# Patient Record
Sex: FEMALE | Race: OTHER | HISPANIC OR LATINO
[De-identification: names, ages, dates, MRNs, and addresses within clinical notes are randomized per-mention and may not be internally consistent; named-entity substitution may affect disease eponyms.]

---

## 2016-12-28 NOTE — ED ADULT NURSE REASSESSMENT NOTE - NIH STROKE SCALE: 8. SENSORY, QM
(1) Mild-to-moderate sensory loss; patient feels pinprick is less sharp or is dull on the affected side; or there is a loss of superficial pain with pinprick, but patient is aware of being touched

## 2016-12-28 NOTE — CONSULT NOTE ADULT - SUBJECTIVE AND OBJECTIVE BOX
CC: Patient is a 71y old  Female who presents with a chief complaint of lt heel pain, weakness (28 Dec 2016 15:06)  Source: Daughter Paulina Diego by phone    HPI:  71yr old female with PMH DM, HTN, HPL, CVA, COPD, CKD-3, carotid artery stenosis, CAD was brought by family after she c/o pain in her Lt foot, abdominal pain, feeling sick to her stomach, Fever  4days ago and "not herself" She has h/o heel osteomyelitis , was treated with iv antibiotics for 3mths, she was in rehab and discharged in end of november with home care. She has had home care with daily dressings. In the ER< vitals are stable, Labs s/o NEERU on CKD, leucocytosis and clinically purulent draining Lt heel ulcer. After discussion with daughter, family states she started having change in mental status at home with grandson, drooling, speech change, weakness. No report of trauma. Currently patient not responsive.         PAST MEDICAL:  Insomnia  Neuropathy  Hyperlipemia  DVT (deep venous thrombosis): Rt Upper Extremity  Atrial fibrillation, unspecified  Chronic diastolic congestive heart failure  MI (myocardial infarction)  PVD (posterior vitreous detachment), bilateral: has stents place in groin  COPD (chronic obstructive pulmonary disease)  Carotid artery stenosis: had surgery  CRI (chronic renal insufficiency)  Hypertension  Diabetes mellitus type II  CVA (cerebral vascular accident)  HEMORRHAGIC STROKE 4-5 YRS AGO       PAST SURGICAL HISTORY:    History of spinal surgery  Amputated BIG toe LEFT,  TRANSMETATARSAL AMP  rt foot  Carotid stenosis, left: s/p carotid endarterectomy  History of cholecystectomy  History of appendectomy      SOCIAL HISTORY: Unobtainable at this time    FAMILY HISTORY:  Family history of diabetes mellitus (Sibling, Grandparent)  No pertinent family history in first degree relatives      ROS: UNOBTAINABLE AT THIS TIME        MEDICATIONS  (STANDING):  insulin lispro (HumaLOG) corrective regimen sliding scale  SubCutaneous three times a day before meals  simvastatin 20milliGRAM(s) Oral at bedtime  dextrose 50% Injectable 25Gram(s) IV Push once  diltiazem   CD 240milliGRAM(s) Oral daily  ertapenem  IVPB 1000milliGRAM(s) IV Intermittent every 24 hours  dextrose 5%. 1000milliLiter(s) IV Continuous <Continuous>  dextrose 50% Injectable 12.5Gram(s) IV Push once  dextrose 50% Injectable 25Gram(s) IV Push once  gabapentin 100milliGRAM(s) Oral two times a day  citalopram 10milliGRAM(s) Oral daily  pantoprazole    Tablet 40milliGRAM(s) Oral before breakfast  labetalol 200milliGRAM(s) Oral every 8 hours  hydrALAZINE 50milliGRAM(s) Oral every 8 hours  mupirocin 2% Ointment 1Application(s) Topical daily    MEDICATIONS  (PRN):  dextrose Gel 1Dose(s) Oral once PRN Blood Glucose LESS THAN 70 milliGRAM(s)/deciliter  glucagon  Injectable 1milliGRAM(s) IntraMuscular once PRN Glucose LESS THAN 70 milligrams/deciliter  ondansetron Injectable 4milliGRAM(s) IV Push every 6 hours PRN Nausea      Allergies    codeine (Other)  morphine (Unknown)  penicillin (Urticaria; Rash)            Vital Signs Last 24 Hrs  T(C): 36.7, Max: 37.7 (12-28 @ 10:13)  T(F): 98, Max: 99.8 (12-28 @ 10:13)  HR: 108 (90 - 110)  BP: 202/102 (190/94 - 235/116)  BP(mean): --  RR: 16 (16 - 18)  SpO2: 99% (98% - 100%)    PHYSICAL EXAM:  GENERAL: UNRESPONSIVE  HEAD:  Atraumatic, Normocephalic  EYES: NOT  OPENING TO VERBAL COMMAND, OR PAINFUL STIMULI TO EXTREMITIES, HOWEVER DOES OPEN EYES WITH ABDOMINAL PALPATION   ENMT: UNABLE TO EXAMINE  NECK: SLIGHT HYPEREXTENSION, TORSED TO THE RIGHT, LEFT WELL HEALED INCISION OVER COROTID AREA WITH COROTID VESSEL DISTENSION  NERVOUS SYSTEM:  Alert & Oriented X3, Good concentration; Motor Strength 5/5 B/L upper and lower extremities; DTRs 2+ intact and symmetric  CHEST/LUNG: Clear to percussion bilaterally; No rales, rhonchi, wheezing, or rubs  HEART: Regular rate, TACHYCARDIC  ABDOMEN: Soft,OBESE, NON DISTENDED, EYE OPENING WITH PALPATION  EXTREMITIES:  2+ Peripheral Pulses, FEM/RAD, NON PALPABLE DP/PT BILAT, NOTED LEFT BIG TOE AMP/RIGHT TRANS METATARSAL AMPUTATION, INTACT DRESSING  LYMPH: No lymphadenopathy noted  SKIN: No rashes or lesions      RADIOLOGY & ADDITIONAL STUDIES:                          13.2   13.38 )-----------( 353      ( 28 Dec 2016 12:46 )             42.0     28 Dec 2016 12:46    133    |  95     |  47.0   ----------------------------<  101    6.3     |  24.0   |  2.96     Ca    9.7        28 Dec 2016 12:46    TPro  8.7    /  Alb  4.2    /  TBili  0.3    /  DBili  x      /  AST  61     /  ALT  24     /  AlkPhos  101    28 Dec 2016 12:46    PT/INR - ( 28 Dec 2016 12:46 )   PT: 11.4 sec;   INR: 1.04 ratio         PTT - ( 28 Dec 2016 12:46 )  PTT:30.0 sec      RADIOLOGY & ADDITIONAL STUDIES:      IMP:  PLAN: DISCUSSED WITH CC: Patient is a 71y old  Female who presents with a chief complaint of lt heel pain, weakness (28 Dec 2016 15:06)  Source: Daughter Paulina Diego by phone AND  ER CHART    HPI:  71yr old female with PMH DM, HTN, HPL, CVA, COPD, CKD-3, carotid artery stenosis, CAD was brought by family after she c/o pain in her Lt foot, abdominal pain, feeling sick to her stomach, Fever  4days ago and "not herself" She has h/o heel osteomyelitis , was treated with iv antibiotics for 3mths, she was in rehab and discharged in end of november with home care. She has had home care with daily dressings. In the ER< vitals are stable, Labs s/o NEERU on CKD, leucocytosis and clinically purulent draining Lt heel ulcer. After discussion with daughter, family states she started having change in mental status at home with grandson, drooling, speech change, weakness. No report of trauma. Currently patient not responsive.         PAST MEDICAL:  Insomnia  Neuropathy  Hyperlipemia  DVT (deep venous thrombosis): Rt Upper Extremity  Atrial fibrillation, unspecified  Chronic diastolic congestive heart failure  MI (myocardial infarction)  PVD (posterior vitreous detachment), bilateral: has stents place in groin  COPD (chronic obstructive pulmonary disease)  Carotid artery stenosis: had surgery  CRI (chronic renal insufficiency)  Hypertension  Diabetes mellitus type II  CVA (cerebral vascular accident)  HEMORRHAGIC STROKE 4-5 YRS AGO       PAST SURGICAL HISTORY:    History of spinal surgery  Amputated BIG toe LEFT,  TRANSMETATARSAL AMP  rt foot  Carotid stenosis, left: s/p carotid endarterectomy  History of cholecystectomy  History of appendectomy      SOCIAL HISTORY: Unobtainable at this time    FAMILY HISTORY:  Family history of diabetes mellitus (Sibling, Grandparent)        ROS: UNOBTAINABLE AT THIS TIME        MEDICATIONS  (STANDING):  insulin lispro (HumaLOG) corrective regimen sliding scale  SubCutaneous three times a day before meals  simvastatin 20milliGRAM(s) Oral at bedtime  dextrose 50% Injectable 25Gram(s) IV Push once  diltiazem   CD 240milliGRAM(s) Oral daily  ertapenem  IVPB 1000milliGRAM(s) IV Intermittent every 24 hours  dextrose 5%. 1000milliLiter(s) IV Continuous <Continuous>  dextrose 50% Injectable 12.5Gram(s) IV Push once  dextrose 50% Injectable 25Gram(s) IV Push once  gabapentin 100milliGRAM(s) Oral two times a day  citalopram 10milliGRAM(s) Oral daily  pantoprazole    Tablet 40milliGRAM(s) Oral before breakfast  labetalol 200milliGRAM(s) Oral every 8 hours  hydrALAZINE 50milliGRAM(s) Oral every 8 hours  mupirocin 2% Ointment 1Application(s) Topical daily    MEDICATIONS  (PRN):  dextrose Gel 1Dose(s) Oral once PRN Blood Glucose LESS THAN 70 milliGRAM(s)/deciliter  glucagon  Injectable 1milliGRAM(s) IntraMuscular once PRN Glucose LESS THAN 70 milligrams/deciliter  ondansetron Injectable 4milliGRAM(s) IV Push every 6 hours PRN Nausea      Allergies    codeine (Other)  morphine (Unknown)  penicillin (Urticaria; Rash)            Vital Signs Last 24 Hrs  T(C): 36.7, Max: 37.7 (12-28 @ 10:13)  T(F): 98, Max: 99.8 (12-28 @ 10:13)  HR: 108 (90 - 110)  BP: 202/102 (190/94 - 235/116)  BP(mean): --  RR: 16 (16 - 18)  SpO2: 99% (98% - 100%)    PHYSICAL EXAM:  GENERAL: UNRESPONSIVE  HEAD:  Atraumatic, Normocephalic  EYES: NOT  OPENING TO VERBAL COMMAND, OR PAINFUL STIMULI TO EXTREMITIES, HOWEVER DOES OPEN EYES WITH ABDOMINAL PALPATION   ENMT: UNABLE TO EXAMINE  NECK: SLIGHT HYPEREXTENSION, TORSED TO THE RIGHT, LEFT WELL HEALED INCISION OVER COROTID AREA WITH COROTID VESSEL DISTENSION  NERVOUS SYSTEM: EYES CLOSED, OPEN ONLY TO ABDOMINAL PALPATION, PUPILS EQUAL, SLUGGISH TO LIGHT BILAT, RIGHTWARD GAZE, NOT FOLLOWING      ANY COMMANDS, NO REACTION TO PERIPHERAL PAINFUL STIMULUS   CHEST/LUNG: Clear to percussion bilaterally; No rales, rhonchi, wheezing, or rubs  HEART: Regular rate, TACHYCARDIC  ABDOMEN: Soft,OBESE, NON DISTENDED, EYE OPENING WITH PALPATION  EXTREMITIES:  2+ Peripheral Pulses, FEM/RAD, NON PALPABLE DP/PT BILAT, NOTED LEFT BIG TOE AMP/RIGHT TRANS METATARSAL AMPUTATION, INTACT DRESSING  LYMPH: No lymphadenopathy noted  SKIN: No rashes or lesions      RADIOLOGY & ADDITIONAL STUDIES:  There is a large right frontal intraparenchymal hematoma, measuring  approximately 4.5 x 7.0 x 5.1 cm (TV by AP by CC). There is breakthrough  into the ventricle with hemorrhage opacifying portions of the right frontal  ventricle, and a small amount of hemorrhage layering in the occipital horn  of the left lateral ventricle. There is marked local mass effect with  approximately 1.5 cm of leftward midline shift. Hemorrhage is opacifying the  third ventricle and ventricular aqueduct as well. There is some hemorrhage  crossing the corpus callosum.    Bones and soft tissues: No acute calvarial fracture.  Sinuses and mastoids: Unremarkable.    IMPRESSION:    Large right frontal intraparenchymal hematoma with ventricular breakthrough  and resultant intraventricular hemorrhage. 1.5 cm of leftward midline shift.  No magdalena herniation at this time. Some hemorrhage appears to be crossing the  corpus callosum. Additionally, there is a hematocrit level with some  relatively less dense hemorrhage in what is likely the body of the right  lateral ventricle. Further evaluation suggested with CTA/MRI as indicated to  exclude the possibility of underlying vascular anomaly or mass lesion.                              13.2   13.38 )-----------( 353      ( 28 Dec 2016 12:46 )             42.0     28 Dec 2016 12:46    133    |  95     |  47.0   ----------------------------<  101    6.3     |  24.0   |  2.96     Ca    9.7        28 Dec 2016 12:46    TPro  8.7    /  Alb  4.2    /  TBili  0.3    /  DBili  x      /  AST  61     /  ALT  24     /  AlkPhos  101    28 Dec 2016 12:46    PT/INR - ( 28 Dec 2016 12:46 )   PT: 11.4 sec;   INR: 1.04 ratio         PTT - ( 28 Dec 2016 12:46 )  PTT:30.0 sec

## 2016-12-28 NOTE — ED ADULT TRIAGE NOTE - CHIEF COMPLAINT QUOTE
pt presents to ED with left sided weakness.  as per grandson, pt is at baseline right now, pt had prior stroke with no deficits as per EMS> pt follows some simple commands. as per ems, grandson states pt was leaning more to left side this morning with drooling noted. no drooling upon arrival to ED,  pt answers yes and no questions at baseline. breathing si even and unlabored. pt slightly weaker to left upper and left lower extremity. denies n/v. last seen normal was last night.

## 2016-12-28 NOTE — H&P ADULT. - PSH
Amputated great toe of left foot    Amputated toe, unspecified laterality  partial amputation of rt foot  Carotid stenosis, left  s/p carotid endarterectomy  History of appendectomy    History of cholecystectomy    History of spinal surgery

## 2016-12-28 NOTE — INPATIENT CERTIFICATION FOR MEDICARE PATIENTS - IN ORDER TO MEET MEDICARE REQUIREMENTS.
In order to meet Medicare requirements, the clinical documentation must support the information cited in the admission order.  Please be sure to provide detailed and clear documentation about the following in the admitting note/history and physical:

## 2016-12-28 NOTE — ED ADULT NURSE NOTE - OBJECTIVE STATEMENT
Received pt in 9 answers some questions appropriately sent by grand son stated that she was not asking normal  pt alert to name and birthdate respirations even weakness on left side extremities due to priror stoke.  Left foot heel pressure ulcer noted approximately 5cm by 5 cm with purulent drainage. Received pt in 9 answers some questions appropriately sent by grand son stated that she was not asking normal  pt alert to name and birthdate respirations even weakness on left side extremities due to priror stoke.  Left foot heel pressure ulcer noted approximately 5cm by 5 cm with purulent drainage.  Left Great toe amputation and 5 toes on right foot amputated

## 2016-12-28 NOTE — ED PROVIDER NOTE - CARE PLAN
Principal Discharge DX:	Heel ulcer  Secondary Diagnosis:	Cellulitis  Secondary Diagnosis:	Renal failure Principal Discharge DX:	Hyperkalemia  Secondary Diagnosis:	Cellulitis  Secondary Diagnosis:	Renal failure

## 2016-12-28 NOTE — H&P ADULT. - PROBLEM SELECTOR PLAN 1
e/o leucocytosis, purulent discharge, AKIon CKD  start zosyn + vanc - renally dosed  f/u cultures  iv fluids to hydrate  ID conulted - Dr Wilkerson  Podiatry - Dr Arellano

## 2016-12-28 NOTE — CONSULT NOTE ADULT - SUBJECTIVE AND OBJECTIVE BOX
S : 71y year old Female seen at bedside for bilateral heel ulceration.  Patient is unable to communicate.      PMH: Insomnia  Neuropathy  Hyperlipemia  DVT (deep venous thrombosis)  Atrial fibrillation, unspecified  Chronic diastolic congestive heart failure  MI (myocardial infarction)  PVD (posterior vitreous detachment), bilateral  COPD (chronic obstructive pulmonary disease)  Carotid artery stenosis  CRI (chronic renal insufficiency)  Hypertension  Diabetes mellitus type II  CVA (cerebral vascular accident)    PSH:History of spinal surgery  Amputated toe, unspecified laterality  Amputated great toe of left foot  Carotid stenosis, left  History of cholecystectomy  History of appendectomy      Allergies:codeine (Other)  morphine (Unknown)  penicillin (Urticaria; Rash)      Labs:                          13.2   13.38 )-----------( 353      ( 28 Dec 2016 12:46 )             42.0     WBC Trend  13.38<H> Date (12-28 @ 12:46)      Chem  28 Dec 2016 12:46    133    |  95     |  47.0   ----------------------------<  101    6.3     |  24.0   |  2.96     Ca    9.7        28 Dec 2016 12:46    TPro  8.7    /  Alb  4.2    /  TBili  0.3    /  DBili  x      /  AST  61     /  ALT  24     /  AlkPhos  101    28 Dec 2016 12:46          T(F): 97.8, Max: 99.8 (12-28 @ 10:13)  HR: 98 (90 - 98)  BP: 216/106 (190/94 - 235/116)  RR: 16 (16 - 18)  SpO2: 99% (99% - 100%)  Wt(kg): --    O:   General: female unable to communicate  Integument:  Skin warm, dry and supple bilateral.    Ulceration Right lateral heel ulceration,  - hyperkeratotic border, wound base Fibrotic , + edema, + tahir-wound erythema, - purulence, - fluctuance, - tracking/tunneling, - probe to bone.   Ulceration Left heel ulceration,  - hyperkeratotic border, wound base Fibrotic ,  + edema, + tahir-wound erythema, - purulence, - fluctuance, - tracking/tunneling, - probe to bone. Left first ray partial amputation.  Vascular: Dorsalis Pedis and Posterior Tibial pulses non-palpable.  Capillary re-fill time less then 3 seconds.  Neuro: unable to asses due to inability to communicate      Deformity:  A: Bilateral heel ulceration      P:   Chart reviewed and Patient evaluated  Discussed diagnosis and treatment with patient  Wound flush with normal saline  Obtained wound culture to be sent to Pathology  Applied dry sterile dressing  ordered bactroban  X-rays of left foot  reviewed : Shows no evidence of gas.  ordered x-rays right foot  Ordered RAVI    Offloading to bilateral Heels.   Podiatry will follow while in house.  Discussed with Attending Dr. Arellano

## 2016-12-28 NOTE — ED ADULT NURSE REASSESSMENT NOTE - NS ED NURSE REASSESS COMMENT FT1
Pt was less verbal and altered Dr. Null made aware orders received for ct of head.  Bp remains elevated Dr. Null also made aware.
pt awake not speaking answers some question pt vomitied green vomit and BP elevated Dr. Chaka jerry.
Pt Invanz will be held till further notice as per Dr Griffith, pt has Penicillin allergy, MD will see if its still ok to give iv abx, pt with high bp hydralazin iv push 10 mg given, Dr Griffith talked with ICU PA at this time no intubation needed, pt will be admitted to ICU.
Pt got labatelol for bp, neuro PA came assess pt, no change in status, waiting for icu bed, will closely moniotor.
Pt transferred to ICU report given to Tosha.
Pt received from holding room nurse with status change, brain bleed, pt with right sided weakness and left sided paralysis, left sided weakness was previous cva at this unable to move left extremities, eye deviation to right side, able to follow command, no respiratory distress, sinus tachy, Dr Griffith aware of pt condition, [facility] ICU PA was at bedside, assessing pt, saline lock # 18 rac 12/28 will continue to closely monitor.

## 2016-12-28 NOTE — INPATIENT CERTIFICATION FOR MEDICARE PATIENTS - PHYSICIAN CONCUR
I concur with the Admission Order and I certify that services are provided in accordance with Section 42 CFR § 412.3

## 2016-12-28 NOTE — CONSULT NOTE ADULT - ATTENDING COMMENTS
PLAN: DISCUSSED WITH DR CADET.  NO NEUROSURGICAL INTERVENTION. DISCUSSION RE CTA BRAIN, HOWEVER PATIENT WITH RENAL INSUFFICIENCY AND CREAT CURRENT ELEVATION.  ADVISE ICU, Q1 NEURO CKS, SPB CONTROL. PLATELET TRANSFUSION FOR PLAVIX HX.   FAMILY: DAUGHTER SINTIA MARVIN AND SON TROY HUTCHISON MADE AWARE OF CONDITION, POOR PROGNOSIS. PLAN: DISCUSSED WITH DR CADET.  NO NEUROSURGICAL INTERVENTION. DISCUSSION RE CTA BRAIN, HOWEVER PATIENT WITH RENAL INSUFFICIENCY AND CREAT CURRENT ELEVATION.  ADVISE ICU, Q1 NEURO CKS, SPB CONTROL. PLATELET TRANSFUSION FOR PLAVIX HX.   FAMILY: DAUGHTER SINTIA MARVIN AND SON TROY HUTCHISON MADE AWARE OF CONDITION, POOR PROGNOSIS.    NSGY Attending;  agree with above  see my note

## 2016-12-28 NOTE — ED PROVIDER NOTE - PMH
Atrial fibrillation, unspecified    Carotid artery stenosis  had surgery  Chronic diastolic congestive heart failure    COPD (chronic obstructive pulmonary disease)    CRI (chronic renal insufficiency)    CVA (cerebral vascular accident)    Diabetes mellitus type II    DVT (deep venous thrombosis)  Rt Upper Extremity  Hyperlipemia    Hypertension    Insomnia    MI (myocardial infarction)    Neuropathy    PVD (posterior vitreous detachment), bilateral  has stents place in groin

## 2016-12-28 NOTE — ED PROVIDER NOTE - OBJECTIVE STATEMENT
71 year old female with a history of afib, diabetes, htn and cva with left sided weakness came to the ED because of left leg/foot pain with generalized weakness. No abd pain, no chest pain, no sob, no headache, no neck pain.

## 2016-12-28 NOTE — H&P ADULT. - ASSESSMENT
71yr old female with PMH DM, HTN, HPL, CVA, COPD, CKD-3, carotid artery stenosis, CAD was brought by family after she c/o pain in her Lt foot, abdominal pain, feeling sick to her stomach, Fever  4days ago and "not herself" She has h/o heel osteomyelitis , was treated with iv antibiotics for 3mths, she was in rehab and discharged in end of november with home care. She has had home care with daily dressings. In the ER< vitals are stable, Labs s/o NEERU on CKD, leucocytosis and clinically purulent draining Lt heel ulcer. Xray foot is pending at the time of admission. She is being admitted for further care - early SIRS with infected DM foot ulcer.

## 2016-12-28 NOTE — ED ADULT NURSE REASSESSMENT NOTE - NIH STROKE SCALE: 2. BEST GAZE, QM
(1) Partial gaze palsy; gaze is abnormal in one or both eyes, but forced deviation or total gaze paresis is not present

## 2016-12-28 NOTE — ED ADULT NURSE REASSESSMENT NOTE - NIH STROKE SCALE: 10. DYSARTHRIA, QM
(2) Severe dysarthria; patients speech is so slurred as to be unintelligible in the absence of or out of proportion to any dysphasia, or is mute/anarthric

## 2016-12-28 NOTE — H&P ADULT. - HISTORY OF PRESENT ILLNESS
71yr old female with PMH DM, HTN, HPL, CVA, COPD, CKD-3, carotid artery stenosis, CAD was brought by family after she c/o pain in her Lt foot, abdominal pain, feeling sick to her stomach, Fever  4days ago and "not herself" She has h/o heel osteomyelitis , was treated with iv antibiotics for 3mths, she was in rehab and discharged in end of november with home care. She has had home care with daily dressings. In the ER< vitals are stable, Labs s/o NEERU on CKD, leucocytosis and clinically purulent draining Lt heel ulcer. Xray foot is pending at the time of admission. She is being admitted for [facility] - SIRS with infected DM foot ulcer.

## 2016-12-28 NOTE — H&P ADULT. - MUSCULOSKELETAL COMMENTS
lt heel pain lt heel - 6cm ulcer - with granulation tissue but purulent discharge. Rt heel ulver - 2cm

## 2016-12-28 NOTE — ED ADULT NURSE REASSESSMENT NOTE - NIH STROKE SCALE: 5B. MOTOR ARM, RIGHT, QM
(1) Drift; limb holds 90 (or 45) degrees, but drifts down before full 10 secs; does not hit bed or other support

## 2016-12-28 NOTE — H&P ADULT. - FAMILY HISTORY
Sibling  Still living? Unknown  Family history of diabetes mellitus, Age at diagnosis: Age Unknown     Grandparent  Still living? Unknown  Family history of diabetes mellitus, Age at diagnosis: Age Unknown

## 2016-12-28 NOTE — H&P ADULT. - PROBLEM SELECTOR PLAN 5
ct home meds  pt and son unable to provide home med list - will attempt to call pharmacy to obtain and confirm home meds

## 2016-12-29 NOTE — PROGRESS NOTE ADULT - SUBJECTIVE AND OBJECTIVE BOX
Patient is a 71y old  Female who presents with a chief complaint of altered mental status (29 Dec 2016 09:32)    PAST MEDICAL & SURGICAL HISTORY:  Insomnia  Neuropathy  Hyperlipemia  DVT (deep venous thrombosis): Rt Upper Extremity  Atrial fibrillation, unspecified  Chronic diastolic congestive heart failure  MI (myocardial infarction)  PVD (posterior vitreous detachment), bilateral: has stents place in groin  COPD (chronic obstructive pulmonary disease)  Carotid artery stenosis: had surgery  CRI (chronic renal insufficiency)  Hypertension  Diabetes mellitus type II  CVA (cerebral vascular accident)  History of spinal surgery  Amputated toe, unspecified laterality: partial amputation of rt foot  Amputated great toe of left foot  Carotid stenosis, left: s/p carotid endarterectomy  History of cholecystectomy  History of appendectomy      BRIEF HOSPITAL COURSE: admit with rx for foot infection.  Obtunded in ED  CT showed large IPH/IVH    Events last 24 hours:   CT bleed size has remained stable but there is more dilitaion of L temporal horn.  Now has developed a-fib with RVR.      Review of Systems:   UATO due to AMS    Non verbal                                                 .  Medications:    niCARdipine Infusion 5mG/Hr IV Continuous <Continuous>      ondansetron Injectable 4milliGRAM(s) IV Push every 6 hours PRN  levETIRAcetam  IVPB 500milliGRAM(s) IV Intermittent every 12 hours        pantoprazole  Injectable 40milliGRAM(s) IV Push daily      dextrose Gel 1Dose(s) Oral once PRN  dextrose 50% Injectable 25Gram(s) IV Push once  dextrose 50% Injectable 12.5Gram(s) IV Push once  dextrose 50% Injectable 25Gram(s) IV Push once  glucagon  Injectable 1milliGRAM(s) IntraMuscular once PRN  insulin lispro (HumaLOG) corrective regimen sliding scale  SubCutaneous every 6 hours    sodium chloride 0.9%. 1000milliLiter(s) IV Continuous <Continuous>      BACItracin   Ointment 1Application(s) Topical two times a day        ICU Vital Signs Last 24 Hrs  T(C): 37.7, Max: 37.7 (12-29 @ 00:30)  T(F): 99.9, Max: 99.9 (12-29 @ 00:30)  HR: 128 (85 - 128)  BP: 144/65 (138/65 - 232/119)  BP(mean): 94 (94 - 166)  ABP: --  ABP(mean): --  RR: 25 (16 - 28)  SpO2: 100% (98% - 100%)    Physical Examination:    General:  No distress moves R arm spontaneously      HEENT:   R upward gaze     PULM:  bilateral BS     CVS: s1 s2 irreg tachy  10 140's    ABD: soft     EXT: warm amputations      SKIN:  warm     Neuro:  Moves R side  R upward gaxe intermittant commands        LABS:                        12.5   20.32 )-----------( 319      ( 29 Dec 2016 03:28 )             39.2     29 Dec 2016 03:28    134    |  98     |  49.0   ----------------------------<  240    5.9     |  21.0   |  2.80     Ca    8.9        29 Dec 2016 03:28    TPro  8.7    /  Alb  4.2    /  TBili  0.3    /  DBili  x      /  AST  61     /  ALT  24     /  AlkPhos  101    28 Dec 2016 12:46          CAPILLARY BLOOD GLUCOSE  127 (29 Dec 2016 18:00)    PT/INR - ( 28 Dec 2016 12:46 )   PT: 11.4 sec;   INR: 1.04 ratio         PTT - ( 28 Dec 2016 12:46 )  PTT:30.0 sec    CULTURES:      RADIOLOGY/IMAGING/ECHO        Impression: No change in size of the large right frontal intracerebral   hematoma with extension into the ventricular system since the prior day   however there is increasing dilatation of the left temporal horn on the   current examination.    These critical values were discussed by Dr. Sandro Casanova with PA Corinne Walsh on 12/29/2016 6:50 PM with read back..        Critical Care time:  35 min  (Reviewing data, imaging, discussing with multidisciplinary team, non inclusive of procedures, discussing goals of care with patient/family)

## 2016-12-29 NOTE — CONSULT NOTE ADULT - SUBJECTIVE AND OBJECTIVE BOX
[id number]  RAHUL HUTCHISON is a 71y  Female   with DM, HTN, HPL, CVA, COPD, CKD-3, carotid artery stenosis, CAD, prior history of treated heel osteo completed Tx in 11/2016, here for left foot pain and abd pain, fevers of several days.     she was found with purulent drainage from left heel ulcer.  Workup in hospital for altered mentation included a CT brain which revealed a large right frontal bleed with IVH, and admitted to ICU.         Past Medical & Surgical Hx:  PAST MEDICAL & SURGICAL HISTORY:  Insomnia  Neuropathy  Hyperlipemia  DVT (deep venous thrombosis): Rt Upper Extremity  Atrial fibrillation, unspecified  Chronic diastolic congestive heart failure  MI (myocardial infarction)  PVD (posterior vitreous detachment), bilateral: has stents place in groin  COPD (chronic obstructive pulmonary disease)  Carotid artery stenosis: had surgery  CRI (chronic renal insufficiency)  Hypertension  Diabetes mellitus type II  CVA (cerebral vascular accident)  History of spinal surgery  Amputated toe, unspecified laterality: partial amputation of rt foot  Amputated great toe of left foot  Carotid stenosis, left: s/p carotid endarterectomy  History of cholecystectomy  History of appendectomy      Problem List:  HEALTH ISSUES - PROBLEM Dx:  Cerebral hemorrhage: Cerebral hemorrhage  Type 2 diabetes mellitus with other skin complication: Type 2 diabetes mellitus with other skin complication  Hypertension: Hypertension  Chronic diastolic congestive heart failure: Chronic diastolic congestive heart failure  Atrial fibrillation, unspecified: Atrial fibrillation, unspecified  Renal failure: Renal failure  Hyperkalemia: Hyperkalemia  Diabetic ulcer of both feet associated with type 2 diabetes mellitus: Diabetic ulcer of both feet associated with type 2 diabetes mellitus          Social Hx:  lives with children  no toxic habits.     FAMILY HISTORY:  Family history of diabetes mellitus (Sibling, Grandparent)  No pertinent family history in first degree relatives      Allergies    codeine (Other)  morphine (Unknown)  penicillin (Urticaria; Rash)    Intolerances        MEDICATIONS  (STANDING):  insulin lispro (HumaLOG) corrective regimen sliding scale  SubCutaneous three times a day before meals  dextrose 50% Injectable 25Gram(s) IV Push once  dextrose 50% Injectable 12.5Gram(s) IV Push once  dextrose 50% Injectable 25Gram(s) IV Push once  hydrALAZINE 50milliGRAM(s) Oral every 8 hours  mupirocin 2% Ointment 1Application(s) Topical daily  niCARdipine Infusion 5mG/Hr IV Continuous <Continuous>  insulin regular  human recombinant. 10Unit(s) IV Push once  sodium chloride 0.9%. 1000milliLiter(s) IV Continuous <Continuous>  dextrose 50% Injectable 25milliLiter(s) IV Push once  levETIRAcetam  IVPB 500milliGRAM(s) IV Intermittent every 12 hours  pantoprazole  Injectable 40milliGRAM(s) IV Push daily    MEDICATIONS  (PRN):  dextrose Gel 1Dose(s) Oral once PRN Blood Glucose LESS THAN 70 milliGRAM(s)/deciliter  glucagon  Injectable 1milliGRAM(s) IntraMuscular once PRN Glucose LESS THAN 70 milligrams/deciliter  ondansetron Injectable 4milliGRAM(s) IV Push every 6 hours PRN Nausea        ANTIBIOTICS:        REVIEW OF SYSTEMS:  CONSTITUTIONAL:  as per HPI  HEENT:  Eyes:  No diplopia or blurred vision. ENT:  No earache, sore throat or runny nose.  CARDIOVASCULAR:  No pressure, squeezing, strangling, tightness, heaviness or aching about the chest, neck, axilla or epigastrium.  RESPIRATORY:  No cough, shortness of breath, PND or orthopnea.  GASTROINTESTINAL:  No nausea, vomiting or diarrhea.  GENITOURINARY:  No dysuria, frequency or urgency. No Blood in urine  MUSCULOSKELETAL:  no joint aches, no muscle pain  SKIN:  No change in skin, hair or nails.  NEUROLOGIC:  No paresthesias, fasciculations, seizures or weakness.  PSYCHIATRIC:  No disorder of thought or mood.  ENDOCRINE:  No heat or cold intolerance, polyuria or polydipsia.  HEMATOLOGICAL:  No easy bruising or bleeding.          I&O's Detail      Physical Exam:  Vital Signs Last 24 Hrs  T(C): 37.7, Max: 37.7 (12-29 @ 00:30)  T(F): 99.9, Max: 99.9 (12-29 @ 00:30)  HR: 107 (85 - 112)  BP: 146/68 (144/71 - 235/116)  BP(mean): 98 (98 - 166)  RR: 25 (16 - 27)  SpO2: 99% (98% - 100%)  Height (cm): 167.6 (12-29 @ 00:30)  Weight (kg): 92.5 (12-29 @ 00:30)  BMI (kg/m2): 32.9 (12-29 @ 00:30)  BSA (m2): 2.02 (12-29 @ 00:30)  GEN: NAD, pleasant  HEENT: normocephalic and atraumatic. EOMI. JAMI.    NECK: Supple. No carotid bruits.  No lymphadenopathy or thyromegaly.  LUNGS: Clear to auscultation.  HEART: Regular rate and rhythm without murmur.  ABDOMEN: Soft, nontender, and nondistended.  Positive bowel sounds.    EXTREMITIES: Without any cyanosis, clubbing, rash, lesions or edema.  NEUROLOGIC: Cranial nerves II through XII are grossly intact.  PSYCHIATRIC: Appropriate affect .  SKIN: No ulceration or induration present.        Labs:   29 Dec 2016 03:28    134    |  98     |  49.0   ----------------------------<  240    5.9     |  21.0   |  2.80     Ca    8.9        29 Dec 2016 03:28    TPro  8.7    /  Alb  4.2    /  TBili  0.3    /  DBili  x      /  AST  61     /  ALT  24     /  AlkPhos  101    28 Dec 2016 12:46                          12.5   20.32 )-----------( 319      ( 29 Dec 2016 03:28 )             39.2       PT/INR - ( 28 Dec 2016 12:46 )   PT: 11.4 sec;   INR: 1.04 ratio         PTT - ( 28 Dec 2016 12:46 )  PTT:30.0 sec    LIVER FUNCTIONS - ( 28 Dec 2016 12:46 )  Alb: 4.2 g/dL / Pro: 8.7 g/dL / ALK PHOS: 101 U/L / ALT: 24 U/L / AST: 61 U/L / GGT: x               CAPILLARY BLOOD GLUCOSE  165 (29 Dec 2016 11:00)  185 (29 Dec 2016 08:00)  141 (28 Dec 2016 17:42)        RECENT CULTURES: [id number]  RAHUL HUTCHISON is a 71y  Female   with DM, HTN, HPL, CVA, COPD, CKD-3, carotid artery stenosis, CAD, prior history of treated heel osteo completed Tx in 11/2016, here for left foot pain and abd pain, fevers of several days.     she was found with purulent drainage from left heel ulcer.  Workup in hospital for altered mentation included a CT brain which revealed a large right frontal bleed with IVH, and admitted to ICU.    we are asked to eval patient for possible infected heel ulcers.       Past Medical & Surgical Hx:  PAST MEDICAL & SURGICAL HISTORY:  Insomnia  Neuropathy  Hyperlipemia  DVT (deep venous thrombosis): Rt Upper Extremity  Atrial fibrillation, unspecified  Chronic diastolic congestive heart failure  MI (myocardial infarction)  PVD (posterior vitreous detachment), bilateral: has stents place in groin  COPD (chronic obstructive pulmonary disease)  Carotid artery stenosis: had surgery  CRI (chronic renal insufficiency)  Hypertension  Diabetes mellitus type II  CVA (cerebral vascular accident)  History of spinal surgery  Amputated toe, unspecified laterality: partial amputation of rt foot  Amputated great toe of left foot  Carotid stenosis, left: s/p carotid endarterectomy  History of cholecystectomy  History of appendectomy      Problem List:  HEALTH ISSUES - PROBLEM Dx:  Cerebral hemorrhage: Cerebral hemorrhage  Type 2 diabetes mellitus with other skin complication: Type 2 diabetes mellitus with other skin complication  Hypertension: Hypertension  Chronic diastolic congestive heart failure: Chronic diastolic congestive heart failure  Atrial fibrillation, unspecified: Atrial fibrillation, unspecified  Renal failure: Renal failure  Hyperkalemia: Hyperkalemia  Diabetic ulcer of both feet associated with type 2 diabetes mellitus: Diabetic ulcer of both feet associated with type 2 diabetes mellitus          Social Hx:  lives with children  no toxic habits.     FAMILY HISTORY:  Family history of diabetes mellitus (Sibling, Grandparent)  No pertinent family history in first degree relatives      Allergies    codeine (Other)  morphine (Unknown)  penicillin (Urticaria; Rash)    Intolerances        MEDICATIONS  (STANDING):  insulin lispro (HumaLOG) corrective regimen sliding scale  SubCutaneous three times a day before meals  dextrose 50% Injectable 25Gram(s) IV Push once  dextrose 50% Injectable 12.5Gram(s) IV Push once  dextrose 50% Injectable 25Gram(s) IV Push once  hydrALAZINE 50milliGRAM(s) Oral every 8 hours  mupirocin 2% Ointment 1Application(s) Topical daily  niCARdipine Infusion 5mG/Hr IV Continuous <Continuous>  insulin regular  human recombinant. 10Unit(s) IV Push once  sodium chloride 0.9%. 1000milliLiter(s) IV Continuous <Continuous>  dextrose 50% Injectable 25milliLiter(s) IV Push once  levETIRAcetam  IVPB 500milliGRAM(s) IV Intermittent every 12 hours  pantoprazole  Injectable 40milliGRAM(s) IV Push daily    MEDICATIONS  (PRN):  dextrose Gel 1Dose(s) Oral once PRN Blood Glucose LESS THAN 70 milliGRAM(s)/deciliter  glucagon  Injectable 1milliGRAM(s) IntraMuscular once PRN Glucose LESS THAN 70 milligrams/deciliter  ondansetron Injectable 4milliGRAM(s) IV Push every 6 hours PRN Nausea           REVIEW OF SYSTEMS: unable to obtain 2nd to mental status       I&O's Detail      Physical Exam:  Vital Signs Last 24 Hrs  T(C): 37.7, Max: 37.7 (12-29 @ 00:30)  T(F): 99.9, Max: 99.9 (12-29 @ 00:30)  HR: 107 (85 - 112)  BP: 146/68 (144/71 - 235/116)  BP(mean): 98 (98 - 166)  RR: 25 (16 - 27)  SpO2: 99% (98% - 100%)  Height (cm): 167.6 (12-29 @ 00:30)  Weight (kg): 92.5 (12-29 @ 00:30)  BMI (kg/m2): 32.9 (12-29 @ 00:30)  BSA (m2): 2.02 (12-29 @ 00:30)  GEN: NAD,NO intubated; non verbal  HEENT: normocephalic and atraumatic.   NECK: Supple. No carotid bruits.   LUNGS: Clear to auscultation.  HEART: Regular rate and rhythm without murmur.  ABDOMEN: Soft, nontender, and nondistended.  Positive bowel sounds.    EXTREMITIES: resolving RIGHT heel ulcer with new skin growth approx size of US QUARTER;  LEFT heel with resolving heel ulcer, islands of new skin, scant serous drainage.     NEUROLOGIC: lethargic  SKIN: No ulceration or induration present.    Labs:   29 Dec 2016 03:28    134    |  98     |  49.0   ----------------------------<  240    5.9     |  21.0   |  2.80     Ca    8.9        29 Dec 2016 03:28    TPro  8.7    /  Alb  4.2    /  TBili  0.3    /  DBili  x      /  AST  61     /  ALT  24     /  AlkPhos  101    28 Dec 2016 12:46                          12.5   20.32 )-----------( 319      ( 29 Dec 2016 03:28 )             39.2       PT/INR - ( 28 Dec 2016 12:46 )   PT: 11.4 sec;   INR: 1.04 ratio         PTT - ( 28 Dec 2016 12:46 )  PTT:30.0 sec    LIVER FUNCTIONS - ( 28 Dec 2016 12:46 )  Alb: 4.2 g/dL / Pro: 8.7 g/dL / ALK PHOS: 101 U/L / ALT: 24 U/L / AST: 61 U/L / GGT: x               CAPILLARY BLOOD GLUCOSE  165 (29 Dec 2016 11:00)  185 (29 Dec 2016 08:00)  141 (28 Dec 2016 17:42)        RECENT CULTURES:

## 2016-12-29 NOTE — PROGRESS NOTE ADULT - SUBJECTIVE AND OBJECTIVE BOX
NEUROSURGERY PROGRESS NOTE:    Pt admitted on 12/28/16 with generalized weakness, headache and heel pain. Pt has a history of previous CVA and residual left sided weakness as per family.  Pt was on asa and plavix.  Presently pt is lethargic but, arousable. Not following commands yet, moves the right upper extrem spon. Opens eyes intermittently has left sided neglect and right sided gaze preference.      Vital Signs Last 24 Hrs  T(C): 37.7, Max: 37.7 (12-29 @ 00:30)  T(F): 99.9, Max: 99.9 (12-29 @ 00:30)  HR: 107 (85 - 112)  BP: 148/67 (144/71 - 235/116)  BP(mean): 97 (97 - 166)  RR: 26 (16 - 27)  SpO2: 100% (98% - 100%)    PHYSICAL EXAM:  GENERAL: NAD, well-developed  HEAD:  Atraumatic, Normocephalic  EYES: EOMI, PERRLA, conjunctiva and sclera clear, reactive small pupils, arcus seniles bilat.  ENMT:Moist mucous membranes,  NECK: Supple, No JVD, Normal thyroid  NERVOUS SYSTEM:  Alert & Oriented Moving right side greater than the left, Upper and lower extremities; Right side raises upper extrem and touches nose, Withdraws with left.  CHEST/LUNG: Clear BS; No rales, rhonchi, wheezing, or rubs  HEART: Regular rate and rhythm; No murmurs, rubs, or gallops  ABDOMEN: Soft, Nontender, Nondistended; Bowel sounds present  EXTREMITIES:  2+ Peripheral Pulses, No clubbing, cyanosis, or edema  LYMPH: No lymphadenopathy noted  SKIN: No rashes or lesions    MEDICATIONS  (STANDING):  insulin lispro (HumaLOG) corrective regimen sliding scale  SubCutaneous three times a day before meals  dextrose 50% Injectable 25Gram(s) IV Push once  dextrose 50% Injectable 12.5Gram(s) IV Push once  dextrose 50% Injectable 25Gram(s) IV Push once  hydrALAZINE 50milliGRAM(s) Oral every 8 hours  niCARdipine Infusion 5mG/Hr IV Continuous <Continuous>  sodium chloride 0.9%. 1000milliLiter(s) IV Continuous <Continuous>  levETIRAcetam  IVPB 500milliGRAM(s) IV Intermittent every 12 hours  pantoprazole  Injectable 40milliGRAM(s) IV Push daily  BACItracin   Ointment 1Application(s) Topical two times a day    MEDICATIONS  (PRN):  dextrose Gel 1Dose(s) Oral once PRN Blood Glucose LESS THAN 70 milliGRAM(s)/deciliter  glucagon  Injectable 1milliGRAM(s) IntraMuscular once PRN Glucose LESS THAN 70 milligrams/deciliter  ondansetron Injectable 4milliGRAM(s) IV Push every 6 hours PRN Nausea      Allergies  Intolerances  codeine (Other)  morphine (Unknown)  penicillin (Urticaria; Rash)      LABS:                        12.5   20.32 )-----------( 319      ( 29 Dec 2016 03:28 )             39.2     29 Dec 2016 03:28    134    |  98     |  49.0   ----------------------------<  240    5.9     |  21.0   |  2.80     Ca    8.9        29 Dec 2016 03:28    TPro  8.7    /  Alb  4.2    /  TBili  0.3    /  DBili  x      /  AST  61     /  ALT  24     /  AlkPhos  101    28 Dec 2016 12:46    PT/INR - ( 28 Dec 2016 12:46 )   PT: 11.4 sec;   INR: 1.04 ratio         PTT - ( 28 Dec 2016 12:46 )  PTT:30.0 sec      RADIOLOGY & ADDITIONAL TESTS:   EXAM:  CT BRAIN                          PROCEDURE DATE:  12/28/2016  Large right frontal intraparenchymal hematoma with ventricular   breakthrough and resultant intraventricular hemorrhage. 1.5 cm of   leftward midline shift. No magdalena herniation at this time. Some hemorrhage   appears to be crossing the corpus callosum. Additionally, there is a   hematocrit level with some relatively less dense hemorrhage in what is   likely the body of the right lateral ventricle. Further evaluation   suggested with CTA/MRI as indicated to exclude the possibility of   underlying vascular anomaly or mass lesion.

## 2016-12-29 NOTE — PROGRESS NOTE ADULT - SUBJECTIVE AND OBJECTIVE BOX
NSGY Attending:    Patient seen  No acute events overnight    Exam:  opens eyes to noxious with some sustained eye opening thereafter  R gaze preference  blinks to threat  L neglect  no speech  no commands  localizes/purposeful to noxious with R  dense left hemiparesis (patient does have baseline deficit on that side s/p prior stroke per family)  GCS E2 V1 M5 -- 8    CT head with large right ICH with dissection into corpus callosum and ventricular system -- ICH measures at least 80 based on dimensions in official read; cisterns grossly patient    A/P -- ICH, likely secondary to hemorrhagic conversion of stroke  Patient is protecting airway -- gaze preference, neglect, and paresis/plegia secondary to brain injury from hemorrhage and not reversible with surgical intervention  Additionally, patient GCS 8 with clot burden of 80 cc.  Patient unlikely to improve neurologically from surgical clot evacuation.  Because of Plavix on board, the patient would be at significant risk of intraoperative blood loss with new or worsening hemorrhage post-op.   I have discussed this with the patient's son and daughter at the bedside and have answered all their questions.  They are in agreement that the risk of surgical intervention outweights the benefit at this time.  Per the ICU, CTA deferred secondary to renal function.  Rec:  workup/supportive care per ICU/neurology  Keppra x 7 days for sz ppx  repeat CT head to assess for stability of clot  MRA brain without contrast to look for underlying vascular study  patient may ultimately benefit from contrasted studies as part of workup (CTA, MRI with and without contrast) as permitted by renal function NSGY Attending:    Patient seen  No acute events overnight    Exam:  opens eyes to noxious with some sustained eye opening thereafter  R gaze preference  blinks to threat  L neglect  no speech  no commands  localizes/purposeful to noxious with R  dense left hemiparesis (patient does have baseline deficit on that side s/p prior stroke per family)  GCS E2 V1 M5 -- 8    CT head with large right ICH with dissection into corpus callosum and ventricular system -- ICH measures at least 80 cc based on dimensions in official read; cisterns grossly patient    A/P -- ICH, likely secondary to hemorrhagic conversion of stroke  Patient is protecting airway -- gaze preference, neglect, and paresis/plegia secondary to brain injury from hemorrhage and not reversible with surgical intervention  Additionally, patient GCS 8 with clot burden of 80 cc.  Patient unlikely to improve neurologically from surgical clot evacuation.  Because of Plavix on board, the patient would be at significant risk of intraoperative blood loss with new or worsening hemorrhage post-op.   I have discussed this with the patient's son and daughter at the bedside and have answered all their questions.  They are in agreement that the risk of surgical intervention outweights the benefit at this time.  Per the ICU, CTA deferred secondary to renal function.  Rec:  workup/supportive care per ICU/neurology  Keppra x 7 days for sz ppx  repeat CT head to assess for stability of clot  MRA brain without contrast to look for underlying vascular abnormality  patient may ultimately benefit from contrasted studies as part of workup (CTA, MRI with and without contrast) as permitted by renal function  Films were reviewed and the case was discussed with PA (see full consult) immediately upon being first contacted about the patient by ICU PA Domingo Mena

## 2016-12-29 NOTE — PROVIDER CONTACT NOTE (EICU) - RECOMMENDATIONS
- q1 neurologic checks.  - improved BP control with nicardipine infusion (goal: SBP ~180-160)  - NSx requested platelets, deferred to bedside provider as to have shared decision making with family about risk v benefits; despite being on antiplatelets. If neurosurgical interventions required then benefit > risk post operatively  - discussion with family about patients goals of treatment

## 2016-12-29 NOTE — CONSULT NOTE ADULT - PROBLEM SELECTOR RECOMMENDATION 3
-patient with pre-existing hemiparesis from previous neurological events.   -overall will be very debilitated. need to give this time to see what, if any recovery there is.

## 2016-12-29 NOTE — CHART NOTE - NSCHARTNOTEFT_GEN_A_CORE
71yr old female with PMH DM, HTN, HPL, CVA, COPD, CKD-3, carotid artery stenosis, CAD was brought by family after she c/o pain in her Lt foot, abdominal pain, feeling sick to her stomach, Fever  4days ago and "not herself" She has h/o heel osteomyelitis , was treated with iv antibiotics for 3mths, she was in rehab and discharged in end of november with home care. She has had home care with daily dressings. In the ER< vitals are stable, Labs s/o NEERU on CKD, leucocytosis and clinically purulent draining Lt heel ulcer.  IN ED patient became poorly responsive.  CT done showing   Large R IPH with midline shift  was on ASA/Plavix got plts.  Her BP was elevated beyond 200 and she was treated with nicardipine infusion for hypertensive crisis      Case d/w Neurosx patient is not an operative candidate due to size of bleed.  At present she is intermittently following a command of a r hand squeeze otherwise not moving L side.        Family at bedside and updated as to change in condition.  For now they want to try all medical measures in the hope that she way at least recover partially.      Hemorrhagic stroke protocol.  CTA to eval for aneurysm not done due to renal dysfx.    add AED.  Palliative care eval to discuss goals of care.    Patient seen on 12/28 In ED and D/W E-ICU MD DR. Velázquez.  CC time 35 min.

## 2016-12-29 NOTE — CONSULT NOTE ADULT - PROBLEM SELECTOR RECOMMENDATION 9
-no neurosurgical intervention  -overall poor prognosis  -supportive measures, repeat CT head and MRI ordered.
no clear infection  WBC elevation likely from stroke  - dc ertapenem/ mupirocin  - local wound care with bacitracin and dressing changes.

## 2016-12-29 NOTE — CONSULT NOTE ADULT - SUBJECTIVE AND OBJECTIVE BOX
CHIEF COMPLAINT: CNS bleed    HPI: 71yFemale    71yr old female with PMH DM, HTN, HPL, CVA, COPD, CKD-3, carotid artery stenosis, CAD was brought by family after she c/o pain in her Lt foot, abdominal pain, feeling sick to her stomach, Fever  4days ago and "not herself" She has h/o heel osteomyelitis , was treated with iv antibiotics for 3mths, she was in rehab and discharged in end of november with home care. She has had home care with daily dressings. In the ER< vitals are stable, Labs s/o NEERU on CKD, leucocytosis and clinically purulent draining Lt heel ulcer. After discussion with daughter, family states she started having change in mental status at home with grandson, drooling, speech change, weakness. No report of trauma. Currently patient not responsive.  CT revealed a large right frontal bleed with IVH. Seen by neurosurgery and admitted to ICU. Neurologic consult reqeusted. Has h/o HTN and was on asa/plavix      PAST MEDICAL & SURGICAL HISTORY:  Insomnia  Neuropathy  Hyperlipemia  DVT (deep venous thrombosis): Rt Upper Extremity  Atrial fibrillation, unspecified  Chronic diastolic congestive heart failure  MI (myocardial infarction)  PVD (posterior vitreous detachment), bilateral: has stents place in groin  COPD (chronic obstructive pulmonary disease)  Carotid artery stenosis: had surgery  CRI (chronic renal insufficiency)  Hypertension  Diabetes mellitus type II  CVA (cerebral vascular accident)  History of spinal surgery  Amputated toe, unspecified laterality: partial amputation of rt foot  Amputated great toe of left foot  Carotid stenosis, left: s/p carotid endarterectomy  History of cholecystectomy  History of appendectomy    MEDICATIONS  (STANDING):  insulin lispro (HumaLOG) corrective regimen sliding scale  SubCutaneous three times a day before meals  simvastatin 20milliGRAM(s) Oral at bedtime  dextrose 50% Injectable 25Gram(s) IV Push once  ertapenem  IVPB 1000milliGRAM(s) IV Intermittent every 24 hours  dextrose 50% Injectable 12.5Gram(s) IV Push once  dextrose 50% Injectable 25Gram(s) IV Push once  citalopram 10milliGRAM(s) Oral daily  pantoprazole    Tablet 40milliGRAM(s) Oral before breakfast  labetalol 200milliGRAM(s) Oral every 8 hours  hydrALAZINE 50milliGRAM(s) Oral every 8 hours  mupirocin 2% Ointment 1Application(s) Topical daily  niCARdipine Infusion 5mG/Hr IV Continuous <Continuous>  levETIRAcetam  IVPB 1000milliGRAM(s) IV Intermittent every 12 hours    MEDICATIONS  (PRN):  dextrose Gel 1Dose(s) Oral once PRN Blood Glucose LESS THAN 70 milliGRAM(s)/deciliter  glucagon  Injectable 1milliGRAM(s) IntraMuscular once PRN Glucose LESS THAN 70 milligrams/deciliter  ondansetron Injectable 4milliGRAM(s) IV Push every 6 hours PRN Nausea    Allergies    codeine (Other)  morphine (Unknown)  penicillin (Urticaria; Rash)    Intolerances        FAMILY HISTORY:  Family history of diabetes mellitus (Sibling, Grandparent)  No pertinent family history in first degree relatives          SOCIAL HISTORY:    Tobacco:  no  Alcohol:  no  Drugs:  no        REVIEW OF SYSTEMS:    Relevant systems are negative except as noted in the chart, HPI, and PMH      VITAL SIGNS:  Vital Signs Last 24 Hrs  T(C): 37.7, Max: 37.7 (12-29 @ 00:30)  T(F): 99.9, Max: 99.9 (12-29 @ 00:30)  HR: 107 (85 - 112)  BP: 146/70 (144/71 - 235/116)  BP(mean): 100 (98 - 166)  RR: 27 (16 - 27)  SpO2: 99% (98% - 100%)    PHYSICAL EXAMINATION:    patient is in bed and arouses to voice  Opens eyes to voice and blinks to threat  Eyes deviated to the right, PERRLA- 2mm   +corneals and dolls eyes  Motor - moves right side to noxious stim and perhaps slight  to voice command  left hemiplegia  bilateral  foot/toe amputations   DTR KJ- absent      LABS:                          12.5   20.32 )-----------( 319      ( 29 Dec 2016 03:28 )             39.2     29 Dec 2016 03:28    134    |  98     |  49.0   ----------------------------<  240    5.9     |  21.0   |  2.80     Ca    8.9        29 Dec 2016 03:28    TPro  8.7    /  Alb  4.2    /  TBili  0.3    /  DBili  x      /  AST  61     /  ALT  24     /  AlkPhos  101    28 Dec 2016 12:46    LIVER FUNCTIONS - ( 28 Dec 2016 12:46 )  Alb: 4.2 g/dL / Pro: 8.7 g/dL / ALK PHOS: 101 U/L / ALT: 24 U/L / AST: 61 U/L / GGT: x           PT/INR - ( 28 Dec 2016 12:46 )   PT: 11.4 sec;   INR: 1.04 ratio         PTT - ( 28 Dec 2016 12:46 )  PTT:30.0 sec      RADIOLOGY & ADDITIONAL STUDIES:      12/28/16 CT- Large right frontal intraparenchymal hematoma with ventricular   breakthrough and resultant intraventricular hemorrhage. 1.5 cm of   leftward midline shift. No magdalena herniation at this time. Some hemorrhage   appears to be crossing the corpus callosum. Additionally, there is a   hematocrit level with some relatively less dense hemorrhage in what is   likely the body of the right lateral ventricle.  IMPRESSION:    Large right frontal bleed with IVH.  Fairly poor prognosis for significant functional recovery. Presently, she seems awake.  No neurosurgical intervention as of now. -?ventriculostomy. clot removal if she deteriorates?.  Situation discussed with family.     PLAN:  1. Critical care and neurosurgery management She appears reasonably stable at this time. On q1h neurochecks  2. Follow up CT   3. Patient is not DNR/DNI

## 2016-12-29 NOTE — CONSULT NOTE ADULT - PROBLEM SELECTOR RECOMMENDATION 4
-met with son, daughter, and daughter in law at bedside. There are 6 sons (1 ) and 1 daughter. The son who is the health care proxy went home to sleep but will be bringing in his health care proxy.

## 2016-12-29 NOTE — CONSULT NOTE ADULT - SUBJECTIVE AND OBJECTIVE BOX
HPI: 71F with PMH as listed admitted 12/28 with foot pain, fevers, thought to have an infected diabetic foot ulcer. Course complicated by headache, became acutely obtunded, sent for CT head found to have  large left IPH/IVH with associated left midline shift.       PERTINENT PMH REVIEWED:  [ ] YES [ ] NO           PMH:   Insomnia  Neuropathy  Hyperlipemia  DVT (deep venous thrombosis): Rt Upper Extremity  Atrial fibrillation, unspecified  Chronic diastolic congestive heart failure  MI (myocardial infarction)  PVD (posterior vitreous detachment), bilateral: has stents place in groin  COPD (chronic obstructive pulmonary disease)  Carotid artery stenosis: had surgery  CRI (chronic renal insufficiency)  Hypertension  Diabetes mellitus type II  CVA (cerebral vascular accident)  History of spinal surgery  Amputated toe, unspecified laterality: partial amputation of rt foot  Amputated great toe of left foot  Carotid stenosis, left: s/p carotid endarterectomy  History of cholecystectomy  History of appendectomy    SOCIAL HISTORY:                                     Admitted from: [ ] home [ ] SNF [ ] [facility]     Surrogate/HCP/Guardian: Phone#:    FAMILY HISTORY:  Family history of diabetes mellitus (Sibling, Grandparent)  No pertinent family history in first degree relatives    MEDICATIONS  (STANDING):  insulin lispro (HumaLOG) corrective regimen sliding scale  SubCutaneous three times a day before meals  dextrose 50% Injectable 25Gram(s) IV Push once  dextrose 50% Injectable 12.5Gram(s) IV Push once  dextrose 50% Injectable 25Gram(s) IV Push once  hydrALAZINE 50milliGRAM(s) Oral every 8 hours  niCARdipine Infusion 5mG/Hr IV Continuous <Continuous>  sodium chloride 0.9%. 1000milliLiter(s) IV Continuous <Continuous>  levETIRAcetam  IVPB 500milliGRAM(s) IV Intermittent every 12 hours  pantoprazole  Injectable 40milliGRAM(s) IV Push daily  BACItracin   Ointment 1Application(s) Topical two times a day    MEDICATIONS  (PRN):  dextrose Gel 1Dose(s) Oral once PRN Blood Glucose LESS THAN 70 milliGRAM(s)/deciliter  glucagon  Injectable 1milliGRAM(s) IntraMuscular once PRN Glucose LESS THAN 70 milligrams/deciliter  ondansetron Injectable 4milliGRAM(s) IV Push every 6 hours PRN Nausea    Allergies    codeine (Other)  morphine (Unknown)  penicillin (Urticaria; Rash)    Review of Systems: Reviewed                     Negative:                     Positive:   [x] Unable to obtain due to poor mentation     PHYSICAL EXAM:    Vital Signs Last 24 Hrs  T(C): 37.7, Max: 37.7 (12-29 @ 00:30)  T(F): 99.9, Max: 99.9 (12-29 @ 00:30)  HR: 106 (85 - 112)  BP: 145/66 (144/71 - 235/116)  BP(mean): 95 (95 - 166)  RR: 26 (16 - 27)  SpO2: 100% (98% - 100%)    General: lethargic    HEENT: atraumatic    Lungs: comfortable    CV: [x] normal  [ ] tachycardia    GI: [x] normal  [ ] distended  [ ] tender  [ ] no BS               [ ] PEG/NG/OG tube    : [x] normal  [ ] incontinent  [ ] oliguria/anuria  [ ] álvarez    MSK: [ ] normal  [x] weakness  [ ] edema             [ ] ambulatory  [ ] bedbound/wheelchair bound    Skin: [ ] normal  [ ] pressure ulcers- Stage_____  [x] no rash    LABS:                        12.5   20.32 )-----------( 319      ( 29 Dec 2016 03:28 )             39.2     29 Dec 2016 03:28    134    |  98     |  49.0   ----------------------------<  240    5.9     |  21.0   |  2.80     Ca    8.9        29 Dec 2016 03:28    TPro  8.7    /  Alb  4.2    /  TBili  0.3    /  DBili  x      /  AST  61     /  ALT  24     /  AlkPhos  101    28 Dec 2016 12:46    PT/INR - ( 28 Dec 2016 12:46 )   PT: 11.4 sec;   INR: 1.04 ratio      PTT - ( 28 Dec 2016 12:46 )  PTT:30.0 sec    RADIOLOGY & ADDITIONAL STUDIES:     Ct head: 12/28: Large right frontal intraparenchymal hematoma with ventricular breakthrough and resultant intraventricular hemorrhage. 1.5 cm of   leftward midline shift. No magdalena herniation at this time. Some hemorrhage appears to be crossing the corpus callosum. Additionally, there is a   hematocrit level with some relatively less dense hemorrhage in what is likely the body of the right lateral ventricle. Further evaluation   suggested with CTA/MRI as indicated to exclude the possibility of underlying vascular anomaly or mass lesion.    ADVANCE DIRECTIVES:  Full Code. HPI: 71F with PMH as listed admitted 12/28 with foot pain, fevers, thought to have an infected diabetic foot ulcer. Course complicated by headache, became acutely obtunded, sent for CT head found to have  large right IPH/IVH with associated left midline shift.       PERTINENT PMH REVIEWED:  [ ] YES [ ] NO           PMH:   Insomnia  Neuropathy  Hyperlipemia  DVT (deep venous thrombosis): Rt Upper Extremity  Atrial fibrillation, unspecified  Chronic diastolic congestive heart failure  MI (myocardial infarction)  PVD (posterior vitreous detachment), bilateral: has stents place in groin  COPD (chronic obstructive pulmonary disease)  Carotid artery stenosis: had surgery  CRI (chronic renal insufficiency)  Hypertension  Diabetes mellitus type II  CVA (cerebral vascular accident)  History of spinal surgery  Amputated toe, unspecified laterality: partial amputation of rt foot  Amputated great toe of left foot  Carotid stenosis, left: s/p carotid endarterectomy  History of cholecystectomy  History of appendectomy    SOCIAL HISTORY:                                     Admitted from: [ ] home [ ] SNF [ ] [facility]     Surrogate/HCP/Guardian: Phone#:    FAMILY HISTORY:  Family history of diabetes mellitus (Sibling, Grandparent)  No pertinent family history in first degree relatives    MEDICATIONS  (STANDING):  insulin lispro (HumaLOG) corrective regimen sliding scale  SubCutaneous three times a day before meals  dextrose 50% Injectable 25Gram(s) IV Push once  dextrose 50% Injectable 12.5Gram(s) IV Push once  dextrose 50% Injectable 25Gram(s) IV Push once  hydrALAZINE 50milliGRAM(s) Oral every 8 hours  niCARdipine Infusion 5mG/Hr IV Continuous <Continuous>  sodium chloride 0.9%. 1000milliLiter(s) IV Continuous <Continuous>  levETIRAcetam  IVPB 500milliGRAM(s) IV Intermittent every 12 hours  pantoprazole  Injectable 40milliGRAM(s) IV Push daily  BACItracin   Ointment 1Application(s) Topical two times a day    MEDICATIONS  (PRN):  dextrose Gel 1Dose(s) Oral once PRN Blood Glucose LESS THAN 70 milliGRAM(s)/deciliter  glucagon  Injectable 1milliGRAM(s) IntraMuscular once PRN Glucose LESS THAN 70 milligrams/deciliter  ondansetron Injectable 4milliGRAM(s) IV Push every 6 hours PRN Nausea    Allergies    codeine (Other)  morphine (Unknown)  penicillin (Urticaria; Rash)    Review of Systems: Reviewed                     Negative:                     Positive:   [x] Unable to obtain due to poor mentation     PHYSICAL EXAM:    Vital Signs Last 24 Hrs  T(C): 37.7, Max: 37.7 (12-29 @ 00:30)  T(F): 99.9, Max: 99.9 (12-29 @ 00:30)  HR: 106 (85 - 112)  BP: 145/66 (144/71 - 235/116)  BP(mean): 95 (95 - 166)  RR: 26 (16 - 27)  SpO2: 100% (98% - 100%)    General: lethargic    HEENT: atraumatic    Lungs: comfortable    CV: [x] normal  [ ] tachycardia    GI: [x] normal  [ ] distended  [ ] tender  [ ] no BS               [ ] PEG/NG/OG tube    : [x] normal  [ ] incontinent  [ ] oliguria/anuria  [ ] álvarez    MSK: [ ] normal  [x] weakness  [ ] edema             [ ] ambulatory  [ ] bedbound/wheelchair bound    Skin: [ ] normal  [ ] pressure ulcers- Stage_____  [x] no rash    LABS:                        12.5   20.32 )-----------( 319      ( 29 Dec 2016 03:28 )             39.2     29 Dec 2016 03:28    134    |  98     |  49.0   ----------------------------<  240    5.9     |  21.0   |  2.80     Ca    8.9        29 Dec 2016 03:28    TPro  8.7    /  Alb  4.2    /  TBili  0.3    /  DBili  x      /  AST  61     /  ALT  24     /  AlkPhos  101    28 Dec 2016 12:46    PT/INR - ( 28 Dec 2016 12:46 )   PT: 11.4 sec;   INR: 1.04 ratio      PTT - ( 28 Dec 2016 12:46 )  PTT:30.0 sec    RADIOLOGY & ADDITIONAL STUDIES:     Ct head: 12/28: Large right frontal intraparenchymal hematoma with ventricular breakthrough and resultant intraventricular hemorrhage. 1.5 cm of   leftward midline shift. No magdalena herniation at this time. Some hemorrhage appears to be crossing the corpus callosum. Additionally, there is a   hematocrit level with some relatively less dense hemorrhage in what is likely the body of the right lateral ventricle. Further evaluation   suggested with CTA/MRI as indicated to exclude the possibility of underlying vascular anomaly or mass lesion.    ADVANCE DIRECTIVES:  Full Code.

## 2016-12-29 NOTE — CONSULT NOTE ADULT - ASSESSMENT
IMP: LARGE RIGHT FRONTAL INTRAPARENCHYMAL HEMORRHAGE, BILAT VENTRICULAR BLOOD, MIDLINE SHIFT.          GCS= 7,  DEVASTATING HEMORRHAGE, POOR PROGNOSIS, USAGE OF PLAVIX AND ASPRIN COMPOUNDING STATUS.
71F with large IPH/IVH with associated midline shift.
71 y.o. woman with DM, here for lethargy, found with large IVH.  Ulcers on Bilateral HEELS do not appear to be infected./

## 2016-12-29 NOTE — CONSULT NOTE ADULT - ATTENDING COMMENTS
More than 50% time spent in counseling and coordinating care. 35 Minutes.     Thank you for the opportunity to assist with the care of this patient.   [facility] [phone].

## 2016-12-29 NOTE — PROGRESS NOTE ADULT - SUBJECTIVE AND OBJECTIVE BOX
RAHUL HUTCHISON Patient is a 71y old  Female who presents with a chief complaint of altered mental status (29 Dec 2016 09:32)      BRIEF HOSPITAL COURSE: 71 yof present with HA, fever, foot ulcer orginally admitted for sepsis - increased AMS awaiting bed in ED CT showing hemorrhagic stroke    Events last 24 hours: No acute changes    REVIEW OF SYSTEMS  unable to obtain	    PAST MEDICAL & SURGICAL HISTORY:  Insomnia  Neuropathy  Hyperlipemia  DVT (deep venous thrombosis): Rt Upper Extremity  Atrial fibrillation, unspecified  Chronic diastolic congestive heart failure  MI (myocardial infarction)  PVD (posterior vitreous detachment), bilateral: has stents place in groin  COPD (chronic obstructive pulmonary disease)  Carotid artery stenosis: had surgery  CRI (chronic renal insufficiency)  Hypertension  Diabetes mellitus type II  CVA (cerebral vascular accident)  History of spinal surgery  Amputated toe, unspecified laterality: partial amputation of rt foot  Amputated great toe of left foot  Carotid stenosis, left: s/p carotid endarterectomy  History of cholecystectomy  History of appendectomy        Medications:    niCARdipine Infusion 5mG/Hr IV Continuous <Continuous>      ondansetron Injectable 4milliGRAM(s) IV Push every 6 hours PRN  levETIRAcetam  IVPB 500milliGRAM(s) IV Intermittent every 12 hours        pantoprazole  Injectable 40milliGRAM(s) IV Push daily      dextrose Gel 1Dose(s) Oral once PRN  dextrose 50% Injectable 25Gram(s) IV Push once  dextrose 50% Injectable 12.5Gram(s) IV Push once  dextrose 50% Injectable 25Gram(s) IV Push once  glucagon  Injectable 1milliGRAM(s) IntraMuscular once PRN  insulin lispro (HumaLOG) corrective regimen sliding scale  SubCutaneous every 6 hours    sodium chloride 0.9%. 1000milliLiter(s) IV Continuous <Continuous>      BACItracin   Ointment 1Application(s) Topical two times a day    ICU Vital Signs Last 24 Hrs  T(C): 37.7, Max: 37.7 (12-29 @ 00:30)  T(F): 99.9, Max: 99.9 (12-29 @ 00:30)  HR: 128 (85 - 128)  BP: 144/65 (138/65 - 232/119)  BP(mean): 94 (94 - 166)  ABP: --  ABP(mean): --  RR: 25 (16 - 28)  SpO2: 100% (98% - 100%)    CAPILLARY BLOOD GLUCOSE  127 (29 Dec 2016 18:00)  187 (29 Dec 2016 14:00)  165 (29 Dec 2016 11:00)  185 (29 Dec 2016 08:00)            LABS:  CBC Full  -  ( 29 Dec 2016 03:28 )  WBC Count : 20.32 K/uL  Hemoglobin : 12.5 g/dL  Hematocrit : 39.2 %  Platelet Count - Automated : 319 K/uL  Mean Cell Volume : 80.7 fl  Mean Cell Hemoglobin : 25.7 pg  Mean Cell Hemoglobin Concentration : 31.9 g/dL  Auto Neutrophil # : x  Auto Lymphocyte # : x  Auto Monocyte # : x  Auto Eosinophil # : x  Auto Basophil # : x  Auto Neutrophil % : x  Auto Lymphocyte % : x  Auto Monocyte % : x  Auto Eosinophil % : x  Auto Basophil % : x    29 Dec 2016 03:28    134    |  98     |  49.0   ----------------------------<  240    5.9     |  21.0   |  2.80     Ca    8.9        29 Dec 2016 03:28    TPro  8.7    /  Alb  4.2    /  TBili  0.3    /  DBili  x      /  AST  61     /  ALT  24     /  AlkPhos  101    28 Dec 2016 12:46           PT/INR - ( 28 Dec 2016 12:46 )   PT: 11.4 sec;   INR: 1.04 ratio         PTT - ( 28 Dec 2016 12:46 )  PTT:30.0 sec        CULTURES: pending      Physical Examination:    General: No acute distress. will open eyes to sternal rub    HEENT: Atraumatic, MMM, Pupils equal, reactive to light.  Symmetric.     PULM: Clear to auscultation bilaterally, no significant sputum production    CVS: tachycardic    ABD: Soft, nondistended, nontender, normoactive bowel sounds, no masses    EXT: No edema, nontender. Distal pulses 2+ equal bilaterally    SKIN: Warm and well perfused, no rashes noted.    Neuro: somulant, opens eyes to pain     RADIOLOGY: EXAM:  CT BRAIN                          PROCEDURE DATE:  12/29/2016  Impression: No change in size of the large right frontal intracerebral   hematoma with extension into the ventricular system since the prior day   however there is increasing dilatation of the left temporal horn on the   current examination.    These critical values were discussed by Dr. Sandro Casanova with PA Corinne Walsh on 12/29/2016 6:50 PM with read back..      CRITICAL CARE TIME SPENT: 45

## 2016-12-29 NOTE — PROGRESS NOTE ADULT - SUBJECTIVE AND OBJECTIVE BOX
S : 71y year old Female seen at bedside for bilateral heel ulceration.  Patient is unable to communicate.      PMH: Insomnia  Neuropathy  Hyperlipemia  DVT (deep venous thrombosis)  Atrial fibrillation, unspecified  Chronic diastolic congestive heart failure  MI (myocardial infarction)  PVD (posterior vitreous detachment), bilateral  COPD (chronic obstructive pulmonary disease)  Carotid artery stenosis  CRI (chronic renal insufficiency)  Hypertension  Diabetes mellitus type II  CVA (cerebral vascular accident)    PSH:History of spinal surgery  Amputated toe, unspecified laterality  Amputated great toe of left foot  Carotid stenosis, left  History of cholecystectomy  History of appendectomy      Allergies:codeine (Other)  morphine (Unknown)  penicillin (Urticaria; Rash)      Labs:                          12.5   20.32 )-----------( 319      ( 29 Dec 2016 03:28 )             39.2       O:   General: female unable to communicate  Integument:  Skin warm, dry and supple bilateral.    Ulceration Right lateral heel ulceration,  - hyperkeratotic border, wound base Fibrotic , + edema, + tahir-wound erythema, - purulence, - fluctuance, - tracking/tunneling, - probe to bone.   Ulceration Left heel ulceration,  - hyperkeratotic border, wound base Fibrotic ,  + edema, + tahir-wound erythema, - purulence, - fluctuance, - tracking/tunneling, - probe to bone. Left first ray partial amputation.  Vascular: Dorsalis Pedis and Posterior Tibial pulses non-palpable.  Capillary re-fill time less then 3 seconds.  Neuro: unable to asses due to inability to communicate      Deformity:  A: Bilateral heel ulceration      P:   Chart reviewed and Patient evaluated  Discussed diagnosis and treatment with patient  Wound flush with normal saline  applied bactroban and Applied dry sterile dressing    X-rays of both foot  reviewed : Shows no evidence of gas.    Offloading to bilateral Heels.   Podiatry will follow while in house.  Discussed with Attending Dr. Arellano

## 2016-12-29 NOTE — PROVIDER CONTACT NOTE (EICU) - ASSESSMENT
- right guaze preference with attempts to move right side. non-verbal, though seems to respond to verbal and tactile stimuli.  - hypertensive, requiring IV infusion now.

## 2016-12-29 NOTE — PROVIDER CONTACT NOTE (EICU) - BACKGROUND
71F a/w diabetic foot ulcer and concerns for sepsis, developed worsening mental status with need for emergent CTH, found with large intraparenchymal and intraventricular bleeding. NSx called and following

## 2016-12-30 NOTE — DIETITIAN INITIAL EVALUATION ADULT. - NS AS NUTRI INTERV MEALS SNACK
As medically feasible advance po diet clear liquid, cho cons -> dash/tlc, cho cons with consistency per SLP recommendations

## 2016-12-30 NOTE — DIETITIAN INITIAL EVALUATION ADULT. - MD RECOMMEND
po supplement/As medically feasible advance po diet clear liquid, cho cons -> dash/tlc, cho cons with consistency per SLP recommendations. If unable to initiate po diet determine pt/pt family wishes regarding artificial nutrition support

## 2016-12-30 NOTE — GOALS OF CARE CONVERSATION - PERSONAL ADVANCE DIRECTIVE - CONVERSATION DETAILS
We explained that she is doing ok for now and protecting her airway and clearing her secretions but the worry is that if she is unable to swallow and has trouble with secretions then her breathing may become compromised and she may require a breathing machine. We explained that if she were to require a breathing tube then shortly after we would transition to trach. They all agreed she would never want to be kept on machines or want a trach with that kind of quality of life.

## 2016-12-30 NOTE — PROGRESS NOTE ADULT - SUBJECTIVE AND OBJECTIVE BOX
Pt is a 71 YOF initially admitted with sepsis but had change MS and had CT demonstrating massive right frontal/parietal ICH with IVH, mass effect and shift.  Today pt is lethargic, grimaces to noxious stimuli, no spontaneous eye opening this morning.  Neurosurgery has consulted and due to the extent of bleed, there are no interventions planned.  Pt's family continue to be hopeful for a miracle.  Will continue to support and talk with family today to develop a plan. Pt is a 71 YOF initially admitted with sepsis but had change MS and had CT demonstrating massive right frontal/parietal ICH with IVH, mass effect and shift.  Today pt is lethargic, grimaces to noxious stimuli, no spontaneous eye opening this morning.  Neurosurgery has consulted and due to the extent of bleed, there are no interventions planned.  Pt's family continue to be hopeful for a miracle.  Will continue to support and talk with family today to develop a plan and address feeding tube and possibly the need for intubation for airway protection.  Developed Afib with RVR overnight and was changed from cardene gtt to cardizem gtt to help with rate control.  Pt also received 2 boluses of Amiodarone  Patient is a 71y old  Female who presents with a chief complaint of altered mental status (29 Dec 2016 09:32)      BRIEF HOSPITAL COURSE: as above  Events last 24 hours: as above    PAST MEDICAL & SURGICAL HISTORY:  Insomnia  Neuropathy  Hyperlipemia  DVT (deep venous thrombosis): Rt Upper Extremity  Atrial fibrillation, unspecified  Chronic diastolic congestive heart failure  MI (myocardial infarction)  PVD (posterior vitreous detachment), bilateral: has stents place in groin  COPD (chronic obstructive pulmonary disease)  Carotid artery stenosis: had surgery  CRI (chronic renal insufficiency)  Hypertension  Diabetes mellitus type II  CVA (cerebral vascular accident)  History of spinal surgery  Amputated toe, unspecified laterality: partial amputation of rt foot  Amputated great toe of left foot  Carotid stenosis, left: s/p carotid endarterectomy  History of cholecystectomy  History of appendectomy      Review of Systems:  Pt unresponsive, unable to answer questions in ROS      Medications:          ondansetron Injectable 4milliGRAM(s) IV Push every 6 hours PRN  levETIRAcetam  IVPB 500milliGRAM(s) IV Intermittent every 12 hours        pantoprazole  Injectable 40milliGRAM(s) IV Push daily      dextrose Gel 1Dose(s) Oral once PRN  dextrose 50% Injectable 25Gram(s) IV Push once  dextrose 50% Injectable 12.5Gram(s) IV Push once  dextrose 50% Injectable 25Gram(s) IV Push once  glucagon  Injectable 1milliGRAM(s) IntraMuscular once PRN  insulin lispro (HumaLOG) corrective regimen sliding scale  SubCutaneous every 6 hours    sodium chloride 0.9%. 1000milliLiter(s) IV Continuous <Continuous>      BACItracin   Ointment 1Application(s) Topical two times a day            ICU Vital Signs Last 24 Hrs  T(C): 37.2, Max: 37.7 ( @ 12:00)  T(F): 98.9, Max: 99.9 ( @ 12:00)  HR: 89 (86 - 130)  BP: 169/75 (138/65 - 172/81)  BP(mean): 108 (94 - 117)  ABP: --  ABP(mean): --  RR: 22 (21 - 28)  SpO2: 100% (98% - 100%)            LABS:                        11.5   20.72 )-----------( 379      ( 30 Dec 2016 04:56 )             35.8     30 Dec 2016 04:56    141    |  101    |  56.0   ----------------------------<  179    3.8     |  22.0   |  2.95     Ca    8.3        30 Dec 2016 04:56    TPro  8.7    /  Alb  4.2    /  TBili  0.3    /  DBili  x      /  AST  61     /  ALT  24     /  AlkPhos  101    28 Dec 2016 12:46          CAPILLARY BLOOD GLUCOSE  164 (30 Dec 2016 06:00)    PT/INR - ( 28 Dec 2016 12:46 )   PT: 11.4 sec;   INR: 1.04 ratio         PTT - ( 28 Dec 2016 12:46 )  PTT:30.0 sec  Urinalysis Basic - ( 29 Dec 2016 20:18 )    Color: Yellow / Appearance: Clear / S.010 / pH: x  Gluc: x / Ketone: Negative  / Bili: Negative / Urobili: Negative mg/dL   Blood: x / Protein: 500 mg/dL / Nitrite: Negative   Leuk Esterase: Negative / RBC: 6-10 /HPF / WBC 6-10   Sq Epi: x / Non Sq Epi: Moderate / Bacteria: Occasional      CULTURES:      Physical Examination:    General: Lethargic, responds to noxious stimulation.    HEENT: Pupils equal, reactive to light, sluggish  Symmetric.    PULM: Clear to auscultation bilaterally, no significant sputum production    CVS: Regular rate and rhythm, now back in NSR, no murmurs, rubs, or gallops    ABD: Soft, nondistended, nontender, normoactive bowel sounds, no masses    EXT: No edema, nontender, b/l toe amputations    SKIN: Warm and well perfused, R heel dressing C/D/I    RADIOLOGY: ***    CRITICAL CARE TIME SPENT: *** Pt is a 71 YOF initially admitted with sepsis but had change MS and had CT demonstrating massive right frontal/parietal ICH with IVH, mass effect and shift.  Today pt is lethargic, grimaces to noxious stimuli, no spontaneous eye opening this morning.  Neurosurgery has consulted and due to the extent of bleed, there are no interventions planned.  Pt's family continue to be hopeful for a miracle.  Will continue to support and talk with family today to develop a plan and address feeding tube and possibly the need for intubation for airway protection.  Developed Afib with RVR overnight and was changed from cardene gtt to cardizem gtt to help with rate control.  Pt also received 2 boluses of Amiodarone.  Patient is a 71y old  Female who presents with a chief complaint of altered mental status (29 Dec 2016 09:32)      BRIEF HOSPITAL COURSE: as above  Events last 24 hours: as above    PAST MEDICAL & SURGICAL HISTORY:  Insomnia  Neuropathy  Hyperlipemia  DVT (deep venous thrombosis): Rt Upper Extremity  Atrial fibrillation, unspecified  Chronic diastolic congestive heart failure  MI (myocardial infarction)  PVD (posterior vitreous detachment), bilateral: has stents place in groin  COPD (chronic obstructive pulmonary disease)  Carotid artery stenosis: had surgery  CRI (chronic renal insufficiency)  Hypertension  Diabetes mellitus type II  CVA (cerebral vascular accident)  History of spinal surgery  Amputated toe, unspecified laterality: partial amputation of rt foot  Amputated great toe of left foot  Carotid stenosis, left: s/p carotid endarterectomy  History of cholecystectomy  History of appendectomy      Review of Systems:  Pt unresponsive, unable to answer questions in ROS      Medications:          ondansetron Injectable 4milliGRAM(s) IV Push every 6 hours PRN  levETIRAcetam  IVPB 500milliGRAM(s) IV Intermittent every 12 hours        pantoprazole  Injectable 40milliGRAM(s) IV Push daily      dextrose Gel 1Dose(s) Oral once PRN  dextrose 50% Injectable 25Gram(s) IV Push once  dextrose 50% Injectable 12.5Gram(s) IV Push once  dextrose 50% Injectable 25Gram(s) IV Push once  glucagon  Injectable 1milliGRAM(s) IntraMuscular once PRN  insulin lispro (HumaLOG) corrective regimen sliding scale  SubCutaneous every 6 hours    sodium chloride 0.9%. 1000milliLiter(s) IV Continuous <Continuous>      BACItracin   Ointment 1Application(s) Topical two times a day            ICU Vital Signs Last 24 Hrs  T(C): 37.2, Max: 37.7 (- @ 12:00)  T(F): 98.9, Max: 99.9 ( @ 12:00)  HR: 89 (86 - 130)  BP: 169/75 (138/65 - 172/81)  BP(mean): 108 (94 - 117)  ABP: --  ABP(mean): --  RR: 22 (21 - 28)  SpO2: 100% (98% - 100%)            LABS:                        11.5   20.72 )-----------( 379      ( 30 Dec 2016 04:56 )             35.8     30 Dec 2016 04:56    141    |  101    |  56.0   ----------------------------<  179    3.8     |  22.0   |  2.95     Ca    8.3        30 Dec 2016 04:56    TPro  8.7    /  Alb  4.2    /  TBili  0.3    /  DBili  x      /  AST  61     /  ALT  24     /  AlkPhos  101    28 Dec 2016 12:46          CAPILLARY BLOOD GLUCOSE  164 (30 Dec 2016 06:00)    PT/INR - ( 28 Dec 2016 12:46 )   PT: 11.4 sec;   INR: 1.04 ratio         PTT - ( 28 Dec 2016 12:46 )  PTT:30.0 sec  Urinalysis Basic - ( 29 Dec 2016 20:18 )    Color: Yellow / Appearance: Clear / S.010 / pH: x  Gluc: x / Ketone: Negative  / Bili: Negative / Urobili: Negative mg/dL   Blood: x / Protein: 500 mg/dL / Nitrite: Negative   Leuk Esterase: Negative / RBC: 6-10 /HPF / WBC 6-10   Sq Epi: x / Non Sq Epi: Moderate / Bacteria: Occasional      CULTURES:      Physical Examination:    General: Lethargic, responds to noxious stimulation.    HEENT: Pupils equal, reactive to light, sluggish  Symmetric.    PULM: Clear to auscultation bilaterally, no significant sputum production    CVS: Regular rate and rhythm, now back in NSR, no murmurs, rubs, or gallops    ABD: Soft, nondistended, nontender, normoactive bowel sounds, no masses    EXT: No edema, nontender, b/l toe amputations    SKIN: Warm and well perfused, R heel dressing C/D/I    RADIOLOGY: ***    CRITICAL CARE TIME SPENT: ***

## 2016-12-30 NOTE — PROGRESS NOTE ADULT - SUBJECTIVE AND OBJECTIVE BOX
OVERNIGHT EVENTS: perhaps more alert    BRIEF HOSPITAL COURSE: 71F with PMH as listed admitted  with foot pain, fevers, thought to have an infected diabetic foot ulcer. Course complicated by headache, became acutely obtunded, sent for CT head found to have large right IPH/IVH with associated left midline shift. Repeat CT head: no real change in bleed.     PRESENT SYMPTOMS:     PAIN SCALE:  0 = none  1 = mild   2 = moderate  3 = severe    Pain: 0    Dyspnea:  [ ] YES [x] NO  Anxiety:  [ ] YES [x] NO  Fatigue: [ ] YES [x] NO  Nausea: [ ] YES [x] NO  Loss of Appetite: [ ] YES [x] NO  Other symptoms: __________    MEDICATIONS  (STANDING):  dextrose 50% Injectable 25Gram(s) IV Push once  dextrose 50% Injectable 12.5Gram(s) IV Push once  dextrose 50% Injectable 25Gram(s) IV Push once  sodium chloride 0.9%. 1000milliLiter(s) IV Continuous <Continuous>  levETIRAcetam  IVPB 500milliGRAM(s) IV Intermittent every 12 hours  pantoprazole  Injectable 40milliGRAM(s) IV Push daily  BACItracin   Ointment 1Application(s) Topical two times a day  insulin lispro (HumaLOG) corrective regimen sliding scale  SubCutaneous every 6 hours  diltiazem Infusion 5mG/Hr IV Continuous <Continuous>  metoprolol Injectable 5milliGRAM(s) IV Push every 6 hours  niCARdipine Infusion 5mG/Hr IV Continuous <Continuous>    MEDICATIONS  (PRN):  dextrose Gel 1Dose(s) Oral once PRN Blood Glucose LESS THAN 70 milliGRAM(s)/deciliter  glucagon  Injectable 1milliGRAM(s) IntraMuscular once PRN Glucose LESS THAN 70 milligrams/deciliter  ondansetron Injectable 4milliGRAM(s) IV Push every 6 hours PRN Nausea    Allergies    codeine (Other)  morphine (Unknown)  penicillin (Urticaria; Rash)    Review of Systems: Reviewed                     Negative:                     Positive:  [x] Unable to obtain due to poor mentation     PHYSICAL EXAM:    Vital Signs Last 24 Hrs  T(C): 36.9, Max: 37.6 (12-30 @ 00:00)  T(F): 98.4, Max: 99.7 (12-30 @ 00:00)  HR: 80 (79 - 130)  BP: 146/65 (138/67 - 177/74)  BP(mean): 93 (93 - 117)  RR: 20 (19 - 28)  SpO2: 97% (95% - 100%)    General: [ ] alert  [ ] oriented x ____ [x] lethargic [ ] agitated                  [ ] cachexia  [ ] nonverbal  [ ] coma    HEENT: [x] normal  [ ] dry mouth  [ ] ET tube/trach    Lungs: [x] comfortable [ ] tachypnea/labored breathing  [ ] excessive secretions    CV: [x] normal  [ ] tachycardia    GI: [x] normal  [ ] distended  [ ] tender  [ ] no BS               [ ] PEG/NG/OG tube    : [ ] normal  [ ] incontinent  [ ] oliguria/anuria  [x] álvarez    MSK: [ ] normal  [ ] weakness  [ ] edema             [ ] ambulatory  [x] bedbound    Skin: [x] normal  [ ] pressure ulcers- Stage_____  [x] no rash    LABS:                        11.5   20.72 )-----------( 379      ( 30 Dec 2016 04:56 )             35.8     30 Dec 2016 04:56    141    |  101    |  56.0   ----------------------------<  179    3.8     |  22.0   |  2.95     Ca    8.3        30 Dec 2016 04:56    Urinalysis Basic - ( 29 Dec 2016 20:18 )    Color: Yellow / Appearance: Clear / S.010 / pH: x  Gluc: x / Ketone: Negative  / Bili: Negative / Urobili: Negative mg/dL   Blood: x / Protein: 500 mg/dL / Nitrite: Negative   Leuk Esterase: Negative / RBC: 6-10 /HPF / WBC 6-10   Sq Epi: x / Non Sq Epi: Moderate / Bacteria: Occasional    RADIOLOGY & ADDITIONAL STUDIES:    CT head: No change in size of the large right frontal intracerebral hematoma with extension into the ventricular system since the prior day however there is increasing dilatation of the left temporal horn on the current examination.     ADVANCE DIRECTIVES:  [x] YES [ ] NO    DNR: [x] YES [ ] NO      Date Completed:                    MOLST [ ] YES [ ] NO   Date Completed:

## 2016-12-30 NOTE — GOALS OF CARE CONVERSATION - PERSONAL ADVANCE DIRECTIVE - WHAT MATTERS MOST
We explained that without placing a breathing tube, CPR would unlikely benefit her so we should not do that to her. They all agreed to continue all other aggressive measures, but if she were to decompensate to the point of needing machines or her heart stopping, they would want CPR or breathing tube. Overall they have emily and want her to get better. We explained that without placing a breathing tube, CPR would unlikely benefit her so we should not do that to her. They all agreed to continue all other aggressive measures, but if she were to decompensate to the point of needing machines or her heart stopping, they would not want CPR or breathing tube. Overall they have emily and want her to get better.

## 2016-12-30 NOTE — DIETITIAN INITIAL EVALUATION ADULT. - NS AS NUTRI INTERV MEDICAL AND FOOD SUPPLEMENTS
Commercial beverage/8oz Glucerna TID and 1 packet Jasen BID via tolerated route as medically feasible

## 2016-12-30 NOTE — PROGRESS NOTE ADULT - SUBJECTIVE AND OBJECTIVE BOX
INTERVAL HISTORY:  no clinical changes. She is stable. Son in attendnece      VITAL SIGNS:  Vital Signs Last 24 Hrs  T(C): 37.2, Max: 37.7 (12-29 @ 10:00)  T(F): 98.9, Max: 99.9 (12-29 @ 10:00)  HR: 90 (86 - 130)  BP: 165/73 (138/65 - 172/81)  BP(mean): 105 (94 - 117)  RR: 25 (21 - 28)  SpO2: 100% (98% - 100%)    PHYSICAL EXAMINATION:    Mentation:  arouses slightly to noxious stim   Language/Speech: no n verbal  CN: eyes deviated right + dolls  Visual Fields: no blink to threat  Motor: moves  right slightly to noxious stim, no movement on left  Sensory:  DTR:  Babinski:      MEDS:  MEDICATIONS  (STANDING):  dextrose 50% Injectable 25Gram(s) IV Push once  dextrose 50% Injectable 12.5Gram(s) IV Push once  dextrose 50% Injectable 25Gram(s) IV Push once  sodium chloride 0.9%. 1000milliLiter(s) IV Continuous <Continuous>  levETIRAcetam  IVPB 500milliGRAM(s) IV Intermittent every 12 hours  pantoprazole  Injectable 40milliGRAM(s) IV Push daily  BACItracin   Ointment 1Application(s) Topical two times a day  insulin lispro (HumaLOG) corrective regimen sliding scale  SubCutaneous every 6 hours  diltiazem Infusion 5mG/Hr IV Continuous <Continuous>    MEDICATIONS  (PRN):  dextrose Gel 1Dose(s) Oral once PRN Blood Glucose LESS THAN 70 milliGRAM(s)/deciliter  glucagon  Injectable 1milliGRAM(s) IntraMuscular once PRN Glucose LESS THAN 70 milligrams/deciliter  ondansetron Injectable 4milliGRAM(s) IV Push every 6 hours PRN Nausea      LABS:                          11.5   20.72 )-----------( 379      ( 30 Dec 2016 04:56 )             35.8     30 Dec 2016 04:56    141    |  101    |  56.0   ----------------------------<  179    3.8     |  22.0   |  2.95     Ca    8.3        30 Dec 2016 04:56    TPro  8.7    /  Alb  4.2    /  TBili  0.3    /  DBili  x      /  AST  61     /  ALT  24     /  AlkPhos  101    28 Dec 2016 12:46    LIVER FUNCTIONS - ( 28 Dec 2016 12:46 )  Alb: 4.2 g/dL / Pro: 8.7 g/dL / ALK PHOS: 101 U/L / ALT: 24 U/L / AST: 61 U/L / GGT: x               RADIOLOGY & ADDITIONAL STUDIES:    CT #2 No change in size of the large right frontal intracerebral   hematoma with extension into the ventricular system since the prior day   however there is increasing dilatation of the left temporal horn on the   current examination.        IMPRESSION & PLAN:  Patient is unchanged and stable. Minimally responsive with eye deviation and left hemiplegia.  P:  1. Critical care and neurosurgery management- BP, fluids, resp, etc  2,. MRI

## 2016-12-30 NOTE — PROGRESS NOTE ADULT - SUBJECTIVE AND OBJECTIVE BOX
NSGY Attg:    Patient seen.  No acute events overnight.  Some increased responsiveness per family.    Exam  no eye opening  R gaze preference/some roving eye movement  intermitted purposeful activity with RUE  not following commands    Repeat CT head shows overall stable clot size.  MRI/A pending  Rec:  cont sz ppx  cont ICU care  supportive care per ICU

## 2016-12-30 NOTE — CHART NOTE - NSCHARTNOTEFT_GEN_A_CORE
Upon Nutritional Assessment by the Registered Dietitian your patient was determined to meet criteria / has evidence of the following diagnosis/diagnoses:          [ ]  Mild Protein Calorie Malnutrition        [X ]  Moderate Protein Calorie Malnutrition        [ ] Severe Protein Calorie Malnutrition        [ ] Unspecified Protein Calorie Malnutrition        [ ] Underweight / BMI <19        [ ] Morbid Obesity / BMI > 40      Findings as based on:  •  Comprehensive nutrition assessment and consultation  •  Calorie counts (nutrient intake analysis)  •  Food acceptance and intake status from observations by staff  •  Follow up  •  Patient education  •  Intervention secondary to interdisciplinary rounds  •   concerns      Treatment:    The following diet has been recommended:        PROVIDER Section:     By signing this assessment you are acknowledging and agree with the diagnosis/diagnoses assigned by the Registered Dietitian    Comments:            x

## 2016-12-31 NOTE — PROGRESS NOTE ADULT - SUBJECTIVE AND OBJECTIVE BOX
Pt is a 71 YOF initially admitted with sepsis but had change MS and had CT demonstrating massive right frontal/parietal ICH with IVH, mass effect and shift.  Neurosurgery does not feel that intervention will be useful.  Pt's family continue to be hopeful for a miracle.     Events last 24 hours: No changes in neuro exam.  MRI completed this afternoon.    PAST MEDICAL & SURGICAL HISTORY:  Insomnia  Neuropathy  Hyperlipemia  DVT (deep venous thrombosis): Rt Upper Extremity  Atrial fibrillation, unspecified  Chronic diastolic congestive heart failure  MI (myocardial infarction)  PVD (posterior vitreous detachment), bilateral: has stents place in groin  COPD (chronic obstructive pulmonary disease)  Carotid artery stenosis: had surgery  CRI (chronic renal insufficiency)  Hypertension  Diabetes mellitus type II  CVA (cerebral vascular accident)  History of spinal surgery  Amputated toe, unspecified laterality: partial amputation of rt foot  Amputated great toe of left foot  Carotid stenosis, left: s/p carotid endarterectomy  History of cholecystectomy  History of appendectomy      Review of Systems:  Pt unresponsive, unable to answer questions in ROS      Medications:    MEDICATIONS  (STANDING):  dextrose 50% Injectable 25Gram(s) IV Push once  dextrose 50% Injectable 12.5Gram(s) IV Push once  dextrose 50% Injectable 25Gram(s) IV Push once  BACItracin   Ointment 1Application(s) Topical two times a day  insulin lispro (HumaLOG) corrective regimen sliding scale  SubCutaneous every 6 hours  ferrous    sulfate 60 mG/mL Liquid 300milliGRAM(s) Enteral Tube daily  diltiazem    Tablet 90milliGRAM(s) Oral every 6 hours  fludroCORTISONE 0.1milliGRAM(s) Oral daily  amLODIPine   Tablet 5milliGRAM(s) Oral daily  levETIRAcetam  Solution 500milliGRAM(s) Oral two times a day  pantoprazole   Suspension 40milliGRAM(s) Oral daily    MEDICATIONS  (PRN):  dextrose Gel 1Dose(s) Oral once PRN Blood Glucose LESS THAN 70 milliGRAM(s)/deciliter  glucagon  Injectable 1milliGRAM(s) IntraMuscular once PRN Glucose LESS THAN 70 milligrams/deciliter  ondansetron Injectable 4milliGRAM(s) IV Push every 6 hours PRN Nausea  hydrALAZINE Injectable 10milliGRAM(s) IV Push every 8 hours PRN systolic > 150  artificial  tears Solution 1Drop(s) Both EYES every 2 hours PRN Dry Eyes            ICU Vital Signs Last 24 Hrs  T(C): 37.6, Max: 37.9 (12-31 @ 04:00)  T(F): 99.7, Max: 100.2 (12-31 @ 04:00)  HR: 83 (80 - 96)  BP: 146/65 (121/60 - 177/74)  BP(mean): 94 (87 - 106)  ABP: --  ABP(mean): --  RR: 22 (17 - 30)  SpO2: 96% (95% - 99%)            LABS:                                   10.8   18.47 )-----------( 406      ( 31 Dec 2016 04:50 )             33.5   31 Dec 2016 04:50    140    |  103    |  72.0   ----------------------------<  196    3.4     |  22.0   |  2.85     Ca    8.1        31 Dec 2016 04:50  Phos  4.0       31 Dec 2016 04:50  Mg     1.6       31 Dec 2016 04:50              CULTURES:      Physical Examination:    General: Lethargic, responds to deepnoxious stimulation with R eye and R arm movements, doesn't localize.    HEENT: Pupils equal, reactive to light, sluggish  Symmetric.  R gaze preference    PULM: Clear to auscultation bilaterally, no significant sputum production    CVS: Regular rate and rhythm, now back in NSR, no murmurs, rubs, or gallops    ABD: Soft, nondistended, nontender, normoactive bowel sounds, no masses    EXT: No edema, nontender, b/l toe amputations    SKIN: Warm and well perfused, R heel dressing C/D/I    RADIOLOGY: Impression: No change in size of the large right frontal intracerebral   hematoma with extension into the ventricular system since the prior day   however there is increasing dilatation of the left temporal horn on the   current examination.Date and time of exam: 12/29/2016 5:36 PM.  Comparison exam: 12/28/2016 8:43 PM.

## 2016-12-31 NOTE — PROGRESS NOTE ADULT - SUBJECTIVE AND OBJECTIVE BOX
Patient seen at beside with no change.      BP: 146/65, HR: 93, afebrile  No change in neuro exam    WBC: 18.47, H/H: 10.8/33.5, Plt: 406  Na: 140, K: 3.4, Cl: 103, CO2: 22, BUN/creat: 72/2.85, G    CT brain 16: "...No change in size of the large right frontal intracerebral   hematoma with extension into the ventricular system since the prior day   however there is increasing dilatation of the left temporal horn on the   current examination..."    A/P: 70 yo woman with a large right frontal intracerebral hemorrhage extending into the ventricular system.  Pending MRI imaging.  Continue to hold all anticoagulation.  Neurosurgery input/reassessment for a possible intraventricular drain if not already done.

## 2017-01-01 NOTE — PROGRESS NOTE ADULT - SUBJECTIVE AND OBJECTIVE BOX
has had a major right sided frontal hemorrhage infarct and is mostly unresponsive. She has an NG tube with meds being given but remains unresponsive and is not able to take po currently. If she remains this way, she may be a candidate for inpatient hospice.    Summary:   REVIEW OF SYSTEMS      General: unobtainable    Skin/Breast:  	  Ophthalmologic:  	  ENMT:	    Respiratory and Thorax:  	  Cardiovascular:	    Gastrointestinal:	    Genitourinary:	    Musculoskeletal:	    Neurological:	    Psychiatric:	    Hematology/Lymphatics:	    Endocrine:	    Allergic/Immunologic:	  Vital Signs Last 24 Hrs  T(C): 37.2, Max: 37.7 (01-01 @ 06:00)  T(F): 99, Max: 99.8 (01-01 @ 06:00)  HR: 96 (84 - 99)  BP: 166/75 (160/70 - 168/75)  BP(mean): 104 (104 - 104)  RR: 18 (16 - 117)  SpO2: 100% (100% - 100%)  PHYSICAL EXAM:  GEN: NAD, unresponsive, appears comfortable, not moving  HEENT: +NG tube  CVS: S1S2 RRR  PULM: CTABL, good breath sounds  ABD: soft, nontender  EXTREM: not moving extremities currenlty  NEURO: mostly unresponsive, is opening eyes but not focusing  PSYCH  SKIN:                          10.6   15.03 )-----------( 421      ( 01 Jan 2017 07:55 )             34.4     01 Jan 2017 07:55    147    |  110    |  92.0   ----------------------------<  233    3.4     |  22.0   |  2.83     Ca    8.3        01 Jan 2017 07:55  Phos  3.7       01 Jan 2017 07:55  Mg     2.2       01 Jan 2017 07:55            Radiology:     MEDICATIONS  (STANDING):  dextrose 50% Injectable 25Gram(s) IV Push once  dextrose 50% Injectable 12.5Gram(s) IV Push once  dextrose 50% Injectable 25Gram(s) IV Push once  BACItracin   Ointment 1Application(s) Topical two times a day  insulin lispro (HumaLOG) corrective regimen sliding scale  SubCutaneous every 6 hours  ferrous    sulfate 60 mG/mL Liquid 300milliGRAM(s) Enteral Tube daily  pantoprazole   Suspension 40milliGRAM(s) Oral daily  diltiazem    Tablet 90milliGRAM(s) Oral every 6 hours  fludroCORTISONE 0.1milliGRAM(s) Oral daily  levETIRAcetam  Solution 500milliGRAM(s) Oral two times a day    MEDICATIONS  (PRN):  dextrose Gel 1Dose(s) Oral once PRN Blood Glucose LESS THAN 70 milliGRAM(s)/deciliter  glucagon  Injectable 1milliGRAM(s) IntraMuscular once PRN Glucose LESS THAN 70 milligrams/deciliter  ondansetron Injectable 4milliGRAM(s) IV Push every 6 hours PRN Nausea  hydrALAZINE Injectable 10milliGRAM(s) IV Push every 8 hours PRN systolic > 150  artificial  tears Solution 1Drop(s) Both EYES every 2 hours PRN Dry Eyes  LORazepam   Injectable 0.5milliGRAM(s) IntraMuscular daily PRN Agitation

## 2017-01-02 ENCOUNTER — TRANSCRIPTION ENCOUNTER (OUTPATIENT)
Age: 72
End: 2017-01-02

## 2017-01-02 NOTE — DISCHARGE NOTE ADULT - CARE PLAN
Principal Discharge DX:	Hemorrhagic stroke  Goal:	hemorrhagic stroke  Instructions for follow-up, activity and diet:	going to hospice, hemorrhagic stroke

## 2017-01-02 NOTE — CHART NOTE - NSCHARTNOTEFT_GEN_A_CORE
Code Sepsis     71 year old female with PMH DM, HTN, HPL, CVA, COPD, CKD-3, carotid artery stenosis, CAD admitted for hemorrhagic CVA with rapid response called for tachycardia in the 190's and fever 102.5.    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 Nunes called and came to bedside, had conversation with family and leaning towards comfort care only. awating son to arrive who is on his way for official decision. As per Dr. Nunes 1L IVF at this time. No antibiotics at this time. Dr. Nunes will follow up with son who is healthcare proxy for final decision on comfort care. NGT feedings stopped at this time for risk of aspiration. Potassium 2.8 this am and 3 K riders ordered. Lactate will not be followed by sepsis team at this time because pt will be comfort care only. Dr. Nunes aware and agrees with plan    Bárbara Burks  PGY3

## 2017-01-02 NOTE — PROVIDER CONTACT NOTE (OTHER) - BACKGROUND
Discussed with Dr. Ryan (hospitalist on call) who ok'd for pt. to receive IVF's at 50ml/ hr. through the night. Ord. written for same till 8am and then to be addressed by PMD.

## 2017-01-02 NOTE — DISCHARGE NOTE ADULT - PATIENT PORTAL LINK FT
“You can access the [employer] Patient Portal, offered by [facility], by registering with the following website: [email or web address]”

## 2017-01-02 NOTE — DISCHARGE NOTE ADULT - HOSPITAL COURSE
had a hemorrhagic stroke in the right frontal region with extension into the ventricles. She was in the ICU and came to the floor with an NG tube. Unfortunately, she did not demonstrate any neurological improvement, and although she does have some movements, (nonpurposeful, spontaneous), she spiked a fever on 01/02/16 after coughing up some tube feeds. We suspect a large aspiration pneumonia and after speaking with her son (the health care proxy), her daughter and her brothers and sister, she's now a comfort care with admission to hospice.

## 2017-01-02 NOTE — PROGRESS NOTE ADULT - SUBJECTIVE AND OBJECTIVE BOX
INTERVAL HISTORY:  Seen at bedside and noted for code sepsis now; no new changes; discussed with family and Dr Nunes as pt is DNR/DNI and looking for comfortable measures.  Family expressed for Hospice Care involvement.      codeine (Other)  morphine (Unknown)  penicillin (Urticaria; Rash)      VITAL SIGNS:  Vital Signs Last 24 Hrs  T(C): 38.3, Max: 38.3 (01-02 @ 07:37)  T(F): 101, Max: 101 (01-02 @ 07:37)  HR: 185 (87 - 185)  BP: 172/88 (168/77 - 176/82)  BP(mean): --  RR: 20 (18 - 20)  SpO2: 98% (98% - 100%)    PHYSICAL EXAMINATION:    Mentation:  arouses slightly to noxious stimuli; lethargic  Language/Speech: no n verbal  CN: eyes deviated right + dolls  Visual Fields: no blink to threat  Motor: moves  right slightly to noxious stim, no movement on left  Sensory: Withdrawal to noxious  Babinski: Withdrawal      MEDS:  MEDICATIONS  (STANDING):  dextrose 50% Injectable 25Gram(s) IV Push once  dextrose 50% Injectable 12.5Gram(s) IV Push once  dextrose 50% Injectable 25Gram(s) IV Push once  BACItracin   Ointment 1Application(s) Topical two times a day  insulin lispro (HumaLOG) corrective regimen sliding scale  SubCutaneous every 6 hours  ferrous    sulfate 60 mG/mL Liquid 300milliGRAM(s) Enteral Tube daily  pantoprazole   Suspension 40milliGRAM(s) Oral daily  diltiazem    Tablet 90milliGRAM(s) Oral every 6 hours  fludroCORTISONE 0.1milliGRAM(s) Oral daily  levETIRAcetam  Solution 500milliGRAM(s) Oral two times a day  acetaminophen    Suspension 650milliGRAM(s) Oral once  potassium chloride  10 mEq/100 mL IVPB 10milliEquivalent(s) IV Intermittent every 1 hour  diltiazem Injectable 10milliGRAM(s) IV Push once  plasma-lyte A Bolus 1000milliLiter(s) IV Bolus once  HYDROmorphone  Injectable 0.5milliGRAM(s) IV Push once    MEDICATIONS  (PRN):  dextrose Gel 1Dose(s) Oral once PRN Blood Glucose LESS THAN 70 milliGRAM(s)/deciliter  glucagon  Injectable 1milliGRAM(s) IntraMuscular once PRN Glucose LESS THAN 70 milligrams/deciliter  ondansetron Injectable 4milliGRAM(s) IV Push every 6 hours PRN Nausea  hydrALAZINE Injectable 10milliGRAM(s) IV Push every 8 hours PRN systolic > 150  artificial  tears Solution 1Drop(s) Both EYES every 2 hours PRN Dry Eyes  LORazepam   Injectable 0.5milliGRAM(s) IntraMuscular daily PRN Agitation      LABS:                          10.6   15.03 )-----------( 421      ( 01 Jan 2017 07:55 )             34.4     02 Jan 2017 08:17    155    |  114    |  105.0  ----------------------------<  214    2.9     |  22.0   |  2.95     Ca    8.6        02 Jan 2017 08:17  Phos  3.7       01 Jan 2017 07:55  Mg     2.2       01 Jan 2017 07:55            RADIOLOGY & ADDITIONAL STUDIES:      MRA head without papito 12/31/16 - Unremarkable for vascular malformation.    MRI brain without papito 12/31/16 - Large right frontal lobe intraparenchymal hematoma with   midline shift and mass effect. Moderate hydrocephalus. Tiny right   temporal lobe cortical infarct. Moderate intraventricular hemorrhage.   Mild posterior left temporal lobe subarachnoid hemorrhage.        IMPRESSION:    Large R frontal ICH with midline shift and mass effect accompanied with moderate Hydrocephalus/SAH    Plan:    - Neurosugery follow up.  - Noted for DNR/DNI and now family looking for comfort measures.  - Overall with poor prognosis and discussed with family and Dr Nunes.  - Supportive care with comfortable measures.  - DVT prophylaxis.  - Reconsult PRN.

## 2017-01-02 NOTE — DISCHARGE NOTE ADULT - NS MD DC FALL RISK RISK
For information on Fall & Injury Prevention, visit [email or web address]

## 2017-01-02 NOTE — PROGRESS NOTE ADULT - SUBJECTIVE AND OBJECTIVE BOX
has had a major right sided frontal hemorrhagic infarct with ventricular pressure and is mostly unresponsive. She was downgraded from the ICU with a very guarded prognosis but her family wanted to "watch" and see if there was a chance she would physically improve or "wake up." TOday, she spiked a high fever and began to have a respiratory distress. We appreciate 's input at the bedside where he expalined to the family that catastrophic nature of the stroke and that her chance of living a functional life was not possible. I spoke for >2 hours with her daughter, her son and the rest of her family today explaining to them the benefits of comfort care. Her son, her HCP, agreed to comfort care and we pulled out her NG tube and started comfort measures. Her family understands her current guarded prognosis and our goals of care are to make her comfortable.  Summary:   REVIEW OF SYSTEMS      General: unobtainable    Skin/Breast:  	  Ophthalmologic:  	  ENMT:	    Respiratory and Thorax:  	  Cardiovascular:	    Gastrointestinal:	    Genitourinary:	    Musculoskeletal:	    Neurological:	    Psychiatric:	    Hematology/Lymphatics:	    Endocrine:	    Allergic/Immunologic:	  Vital Signs Last 24 Hrs  T(C): 37.2, Max: 37.7 (01-01 @ 06:00)  T(F): 99, Max: 99.8 (01-01 @ 06:00)  HR: 96 (84 - 99)  BP: 166/75 (160/70 - 168/75)  BP(mean): 104 (104 - 104)  RR: 18 (16 - 117)  SpO2: 100% (100% - 100%)  PHYSICAL EXAM:  GEN: NAD, unresponsive, appears comfortable, not moving  HEENT: +NG tube  CVS: S1S2 RRR  PULM: +RML and RLL rhonchi, gurgly breathing sounds, +tachypnea  ABD: soft, nontender  EXTREM: not moving extremities currently  NEURO: mostly unresponsive, is opening eyes but not focusing; spontaneous movements, not purposeful  PSYCH  SKIN:                          10.6   15.03 )-----------( 421      ( 01 Jan 2017 07:55 )             34.4     01 Jan 2017 07:55    147    |  110    |  92.0   ----------------------------<  233    3.4     |  22.0   |  2.83     Ca    8.3        01 Jan 2017 07:55  Phos  3.7       01 Jan 2017 07:55  Mg     2.2       01 Jan 2017 07:55            Radiology:     MEDICATIONS  (STANDING):  dextrose 50% Injectable 25Gram(s) IV Push once  dextrose 50% Injectable 12.5Gram(s) IV Push once  dextrose 50% Injectable 25Gram(s) IV Push once  BACItracin   Ointment 1Application(s) Topical two times a day  insulin lispro (HumaLOG) corrective regimen sliding scale  SubCutaneous every 6 hours  ferrous    sulfate 60 mG/mL Liquid 300milliGRAM(s) Enteral Tube daily  pantoprazole   Suspension 40milliGRAM(s) Oral daily  diltiazem    Tablet 90milliGRAM(s) Oral every 6 hours  fludroCORTISONE 0.1milliGRAM(s) Oral daily  levETIRAcetam  Solution 500milliGRAM(s) Oral two times a day    MEDICATIONS  (PRN):  dextrose Gel 1Dose(s) Oral once PRN Blood Glucose LESS THAN 70 milliGRAM(s)/deciliter  glucagon  Injectable 1milliGRAM(s) IntraMuscular once PRN Glucose LESS THAN 70 milligrams/deciliter  ondansetron Injectable 4milliGRAM(s) IV Push every 6 hours PRN Nausea  hydrALAZINE Injectable 10milliGRAM(s) IV Push every 8 hours PRN systolic > 150  artificial  tears Solution 1Drop(s) Both EYES every 2 hours PRN Dry Eyes  LORazepam   Injectable 0.5milliGRAM(s) IntraMuscular daily PRN Agitation

## 2017-01-03 NOTE — CHART NOTE - NSCHARTNOTEFT_GEN_A_CORE
Saw patient again at the request of  Unger as family was concerned she is uncomfortable and her arms are swollen. I explained the grave prognosis and that there is a lot more we can do to keep her comfortable. I explained I would be ordering a continuous infusion to help with her breathing and a medication to dry her secretions. They understand.

## 2017-01-03 NOTE — PROGRESS NOTE ADULT - SUBJECTIVE AND OBJECTIVE BOX
OVERNIGHT EVENTS: worsening respiratory distress and secretions.     BRIEF HOSPITAL COURSE: 71F with PMH as listed admitted 12/28 with foot pain, fevers, thought to have an infected diabetic foot ulcer. Course complicated by headache, became acutely obtunded, sent for CT head found to have large right IPH/IVH with associated left midline shift. Patient with worsening respiratory failure, no improvement in mental state, now on comfort care only    PRESENT SYMPTOMS:     PAIN SCALE:  0 = none  1 = mild   2 = moderate  3 = severe    Pain: 0    Dyspnea:  [x] YES --> respiratory failure [ ] NO  Anxiety:  [ ] YES [x] NO  Fatigue: [ ] YES [x] NO  Nausea: [ ] YES [x] NO  Loss of Appetite: [ ] YES [x] NO  Other symptoms: __________    MEDICATIONS  (STANDING):  BACItracin   Ointment 1Application(s) Topical two times a day  pantoprazole  Injectable 40milliGRAM(s) IV Push daily  HYDROmorphone  Injectable 1milliGRAM(s) IV Push every 4 hours  glycopyrrolate Injectable 0.4milliGRAM(s) IV Push every 6 hours    MEDICATIONS  (PRN):  hydrALAZINE Injectable 10milliGRAM(s) IV Push every 8 hours PRN systolic > 150  glycopyrrolate Injectable 0.4milliGRAM(s) IV Push every 6 hours PRN secretions  HYDROmorphone  Injectable 2milliGRAM(s) IV Push every 4 hours PRN Moderate Pain (4 - 6)  LORazepam   Injectable 0.5milliGRAM(s) IV Push every 4 hours PRN Anxiety    Allergies    codeine (Other)  morphine (Unknown)  penicillin (Urticaria; Rash)    Review of Systems: Reviewed                     Negative:                     Positive: respiratory failure  [x] Unable to obtain due to poor mentation     PHYSICAL EXAM:    Vital Signs Last 24 Hrs  T(C): 38.3, Max: 38.3 (01-03 @ 07:10)  T(F): 101, Max: 101 (01-03 @ 07:10)  HR: 150 (150 - 150)  BP: 167/78 (167/78 - 167/78)  BP(mean): --  RR: 18 (18 - 18)  SpO2: 97% (97% - 97%)    General: [ ] alert  [ ] oriented x ____ [x] lethargic [ ] agitated                  [ ] cachexia  [ ] nonverbal  [ ] coma    HEENT: [x] normal  [ ] dry mouth  [ ] ET tube/trach    Lungs: [ ] comfortable [x] tachypnea/labored breathing  [x] excessive secretions    CV: [x] normal  [ ] tachycardia    GI: [x] normal  [ ] distended  [ ] tender  [ ] no BS               [ ] PEG/NG/OG tube    : [ ] normal  [x] incontinent  [ ] oliguria/anuria  [ ] álvarez    MSK: [ ] normal  [ ] weakness  [ ] edema             [ ] ambulatory  [x] bedbound    Skin: [ ] normal  [ ] pressure ulcers- Stage_____  [x] no rash    LABS:                        11.9   19.45 )-----------( 494      ( 02 Jan 2017 10:49 )             39.1     02 Jan 2017 10:49    154    |  114    |  102.0  ----------------------------<  201    3.2     |  21.0   |  2.81     Ca    9.0        02 Jan 2017 10:49  Phos  3.8       02 Jan 2017 10:49  Mg     2.2       02 Jan 2017 10:49    TPro  7.6    /  Alb  3.3    /  TBili  0.2    /  DBili  x      /  AST  44     /  ALT  57     /  AlkPhos  96     02 Jan 2017 10:49    RADIOLOGY & ADDITIONAL STUDIES:    ADVANCE DIRECTIVES:  [ ] YES [ ] NO    DNR: [ ] YES [ ] NO      Date Completed:                    [facility] [ ] YES [ ] NO   Date Completed:     COUNSELING:  Advanced Care Planning Discussion - Time Spent ______Minutes.   More than 50% time spent in counseling and coordinating care. ______ Minutes.     Thank you for the opportunity to assist with the care of this patient.   [facility] [phone]. OVERNIGHT EVENTS: worsening respiratory distress and secretions.     BRIEF HOSPITAL COURSE: 71F with PMH as listed admitted 12/28 with foot pain, fevers, thought to have an infected diabetic foot ulcer. Course complicated by headache, became acutely obtunded, sent for CT head found to have large right IPH/IVH with associated left midline shift. Patient with worsening respiratory failure, no improvement in mental state, now on comfort care only.     PRESENT SYMPTOMS:     PAIN SCALE:  0 = none  1 = mild   2 = moderate  3 = severe    Pain: 0    Dyspnea:  [x] YES --> respiratory failure [ ] NO  Anxiety:  [ ] YES [x] NO  Fatigue: [ ] YES [x] NO  Nausea: [ ] YES [x] NO  Loss of Appetite: [ ] YES [x] NO  Other symptoms: __________    MEDICATIONS  (STANDING):  BACItracin   Ointment 1Application(s) Topical two times a day  pantoprazole  Injectable 40milliGRAM(s) IV Push daily  HYDROmorphone  Injectable 1milliGRAM(s) IV Push every 4 hours  glycopyrrolate Injectable 0.4milliGRAM(s) IV Push every 6 hours    MEDICATIONS  (PRN):  hydrALAZINE Injectable 10milliGRAM(s) IV Push every 8 hours PRN systolic > 150  glycopyrrolate Injectable 0.4milliGRAM(s) IV Push every 6 hours PRN secretions  HYDROmorphone  Injectable 2milliGRAM(s) IV Push every 4 hours PRN Moderate Pain (4 - 6)  LORazepam   Injectable 0.5milliGRAM(s) IV Push every 4 hours PRN Anxiety    Allergies    codeine (Other)  morphine (Unknown)  penicillin (Urticaria; Rash)    Review of Systems: Reviewed                     Negative:                     Positive: respiratory failure  [x] Unable to obtain due to poor mentation     PHYSICAL EXAM:    Vital Signs Last 24 Hrs  T(C): 38.3, Max: 38.3 (01-03 @ 07:10)  T(F): 101, Max: 101 (01-03 @ 07:10)  HR: 150 (150 - 150)  BP: 167/78 (167/78 - 167/78)  BP(mean): --  RR: 18 (18 - 18)  SpO2: 97% (97% - 97%)    General: [ ] alert  [ ] oriented x ____ [x] lethargic [ ] agitated                  [ ] cachexia  [ ] nonverbal  [ ] coma    HEENT: [x] normal  [ ] dry mouth  [ ] ET tube/trach    Lungs: [ ] comfortable [x] tachypnea/labored breathing  [x] excessive secretions    CV: [x] normal  [ ] tachycardia    GI: [x] normal  [ ] distended  [ ] tender  [ ] no BS               [ ] PEG/NG/OG tube    : [ ] normal  [x] incontinent  [ ] oliguria/anuria  [ ] álvarez    MSK: [ ] normal  [ ] weakness  [ ] edema             [ ] ambulatory  [x] bedbound    Skin: [ ] normal  [ ] pressure ulcers- Stage_____  [x] no rash    LABS:                        11.9   19.45 )-----------( 494      ( 02 Jan 2017 10:49 )             39.1     02 Jan 2017 10:49    154    |  114    |  102.0  ----------------------------<  201    3.2     |  21.0   |  2.81     Ca    9.0        02 Jan 2017 10:49  Phos  3.8       02 Jan 2017 10:49  Mg     2.2       02 Jan 2017 10:49    TPro  7.6    /  Alb  3.3    /  TBili  0.2    /  DBili  x      /  AST  44     /  ALT  57     /  AlkPhos  96     02 Jan 2017 10:49    RADIOLOGY & ADDITIONAL STUDIES:    ADVANCE DIRECTIVES:  [ ] YES [ ] NO    DNR: [ ] YES [ ] NO      Date Completed:                    RAD[facility] [ ] YES [ ] NO   Date Completed:

## 2017-01-03 NOTE — PROGRESS NOTE ADULT - SUBJECTIVE AND OBJECTIVE BOX
has had a major right sided frontal hemorrhagic infarct with ventricular pressure and is mostly unresponsive. She's on a comfort measures plan and I spoke to the family again today. We appreciate palliative input and today, her dilaudid was uptitrated to a drip to control her respiratory symptoms of dyspnea better.    Summary:   REVIEW OF SYSTEMS      General: unobtainable    Skin/Breast:  	  Ophthalmologic:  	  ENMT:	    Respiratory and Thorax:   	  Cardiovascular:	    Gastrointestinal:	    Genitourinary:	    Musculoskeletal:	    Neurological:	    Psychiatric:	    Hematology/Lymphatics:	    Endocrine:	    Allergic/Immunologic:	  Vital Signs Last 24 Hrs  T(C): 37.2, Max: 37.7 (01-01 @ 06:00)  T(F): 99, Max: 99.8 (01-01 @ 06:00)  HR: 96 (84 - 99)  BP: 166/75 (160/70 - 168/75)  BP(mean): 104 (104 - 104)  RR: 18 (16 - 117)  SpO2: 100% (100% - 100%)  PHYSICAL EXAM:  GEN: NAD, unresponsive, appears comfortable, not moving, febrile  HEENT: +NG tube  CVS: S1S2 RRR  PULM: +RML and RLL rhonchi, gurgly breathing sounds, +tachypnea  ABD: soft, nontender  EXTREM: not moving extremities currently  NEURO: mostly unresponsive, is opening eyes but not focusing; spontaneous movements, not purposeful  PSYCH  SKIN:                          10.6   15.03 )-----------( 421      ( 01 Jan 2017 07:55 )             34.4     01 Jan 2017 07:55    147    |  110    |  92.0   ----------------------------<  233    3.4     |  22.0   |  2.83     Ca    8.3        01 Jan 2017 07:55  Phos  3.7       01 Jan 2017 07:55  Mg     2.2       01 Jan 2017 07:55            Radiology:     MEDICATIONS  (STANDING):  dextrose 50% Injectable 25Gram(s) IV Push once  dextrose 50% Injectable 12.5Gram(s) IV Push once  dextrose 50% Injectable 25Gram(s) IV Push once  BACItracin   Ointment 1Application(s) Topical two times a day  insulin lispro (HumaLOG) corrective regimen sliding scale  SubCutaneous every 6 hours  ferrous    sulfate 60 mG/mL Liquid 300milliGRAM(s) Enteral Tube daily  pantoprazole   Suspension 40milliGRAM(s) Oral daily  diltiazem    Tablet 90milliGRAM(s) Oral every 6 hours  fludroCORTISONE 0.1milliGRAM(s) Oral daily  levETIRAcetam  Solution 500milliGRAM(s) Oral two times a day    MEDICATIONS  (PRN):  dextrose Gel 1Dose(s) Oral once PRN Blood Glucose LESS THAN 70 milliGRAM(s)/deciliter  glucagon  Injectable 1milliGRAM(s) IntraMuscular once PRN Glucose LESS THAN 70 milligrams/deciliter  ondansetron Injectable 4milliGRAM(s) IV Push every 6 hours PRN Nausea  hydrALAZINE Injectable 10milliGRAM(s) IV Push every 8 hours PRN systolic > 150  artificial  tears Solution 1Drop(s) Both EYES every 2 hours PRN Dry Eyes  LORazepam   Injectable 0.5milliGRAM(s) IntraMuscular daily PRN Agitation

## 2017-01-03 NOTE — PROGRESS NOTE ADULT - PROBLEM SELECTOR PLAN 6
ccm
Afib with RVR overnight.  Amio loaded  Cardizem gtt
-psychosocial support provided to patients family at bedside. They understand the grave prognosis and just want to make sure she is comfortable.

## 2017-01-04 NOTE — PROGRESS NOTE ADULT - PROBLEM SELECTOR PLAN 4
ccm
Continue IV antihypertensives  Not tolerating PO meds
monitor may need javier blocking agent
-due to hemorrhage. supportive measures
-oral hygeine
-requires assist with all ADLs

## 2017-01-04 NOTE — PROGRESS NOTE ADULT - PROBLEM SELECTOR PLAN 3
Off antibiotics per ID  Wound care
Off antibiotics per ID  Wound care
cardene drip keep BP <150
-NG tube to be place....await mental status assessments over the next couple of days. If no improvement will need to discuss longer term planning of transitioning NG tube to PEG tube....
-add robinul ATC
-robinul

## 2017-01-04 NOTE — PROGRESS NOTE ADULT - PROBLEM SELECTOR PROBLEM 4
Essential hypertension
Atrial fibrillation, unspecified
Essential hypertension
Hypertension
Dysphagia, unspecified type
Encephalopathy
Functional quadriplegia

## 2017-01-04 NOTE — PROGRESS NOTE ADULT - SUBJECTIVE AND OBJECTIVE BOX
OVERNIGHT EVENTS: breathing pattern has changes. longer pauses and periods of apnea.     BRIEF HOSPITAL COURSE: 71F with PMH as listed admitted 12/28 with foot pain, fevers, thought to have an infected diabetic foot ulcer. Course complicated by headache, became acutely obtunded, sent for CT head found to have large right IPH/IVH with associated left midline shift. Patient with worsening respiratory failure, no improvement in mental state, now on comfort care only.     MEDICATIONS  (STANDING):  BACItracin   Ointment 1Application(s) Topical two times a day  glycopyrrolate Injectable 0.4milliGRAM(s) IV Push every 6 hours  scopolamine   Patch 1.5milliGRAM(s) Transdermal every 3 days  HYDROmorphone Infusion 1.5mG/Hr IV Continuous <Continuous>    MEDICATIONS  (PRN):  HYDROmorphone  Injectable 2milliGRAM(s) IV Push every 3 hours PRN Moderate Pain (4 - 6)  HYDROmorphone  Injectable 2milliGRAM(s) IV Push every 2 hours PRN dyspnea  LORazepam   Injectable 1milliGRAM(s) IV Push every 2 hours PRN Anxiety  acetaminophen  Suppository 650milliGRAM(s) Rectal every 6 hours PRN For Temp greater than 38 C (100.4 F)    Allergies    codeine (Other)  morphine (Unknown)  penicillin (Urticaria; Rash)    Review of Systems: Reviewed                     Negative:                     Positive: respiratory failure  [x] Unable to obtain due to poor mentation     PHYSICAL EXAM:    Vital Signs Last 24 Hrs  none    General: [ ] alert  [ ] oriented x ____ [x] lethargic [ ] agitated                  [ ] cachexia  [ ] nonverbal  [ ] coma    HEENT: [x] normal  [ ] dry mouth  [ ] ET tube/trach    Lungs: [ ] comfortable [x] tachypnea/labored breathing  [x] excessive secretions    CV: [x] normal  [ ] tachycardia    GI: [x] normal  [ ] distended  [ ] tender  [ ] no BS               [ ] PEG/NG/OG tube    : [ ] normal  [x] incontinent  [ ] oliguria/anuria  [ ] álvarez    MSK: [ ] normal  [ ] weakness  [ ] edema             [ ] ambulatory  [x] bedbound    Skin: [ ] normal  [ ] pressure ulcers- Stage_____  [x] no rash    RADIOLOGY & ADDITIONAL STUDIES: none    ADVANCE DIRECTIVES:  DNR/I

## 2017-01-04 NOTE — PROGRESS NOTE ADULT - SUBJECTIVE AND OBJECTIVE BOX
has had a major right sided frontal hemorrhagic infarct with ventricular pressure and is mostly unresponsive. She's on a comfort measures plan and I spoke to the family again today. We appreciate palliative input and today, her dilaudid was uptitrated to a drip to control her respiratory symptoms of dyspnea better. At 12:44, she passed and her family was at the bedside. I extended our warmest condolences on behalf of [facility].     Summary:   REVIEW OF SYSTEMS      General: unobtainable, unresponsive    Skin/Breast:  	  Ophthalmologic:  	  ENMT:	    Respiratory and Thorax:   	  Cardiovascular:	    Gastrointestinal:	    Genitourinary:	    Musculoskeletal:	    Neurological:	    Psychiatric:	    Hematology/Lymphatics:	    Endocrine:	    Allergic/Immunologic:	  Vital Signs Last 24 Hrs  T(C): 37.2, Max: 37.7 (01-01 @ 06:00)  T(F): 99, Max: 99.8 (01-01 @ 06:00)  HR: 96 (84 - 99)  BP: 166/75 (160/70 - 168/75)  BP(mean): 104 (104 - 104)  RR: 18 (16 - 117)  SpO2: 100% (100% - 100%)  PHYSICAL EXAM:  GEN: NAD, unresponsive, appears comfortable, not moving, febrile  HEENT: +NG tube  CVS: S1S2 RRR  PULM: +RML and RLL rhonchi, gurgly breathing sounds, +tachypnea  ABD: soft, nontender  EXTREM: not moving extremities currently  NEURO: mostly unresponsive, is opening eyes but not focusing; spontaneous movements, not purposeful  PSYCH  SKIN:                          10.6   15.03 )-----------( 421      ( 01 Jan 2017 07:55 )             34.4     01 Jan 2017 07:55    147    |  110    |  92.0   ----------------------------<  233    3.4     |  22.0   |  2.83     Ca    8.3        01 Jan 2017 07:55  Phos  3.7       01 Jan 2017 07:55  Mg     2.2       01 Jan 2017 07:55            Radiology:     MEDICATIONS  (STANDING):  dextrose 50% Injectable 25Gram(s) IV Push once  dextrose 50% Injectable 12.5Gram(s) IV Push once  dextrose 50% Injectable 25Gram(s) IV Push once  BACItracin   Ointment 1Application(s) Topical two times a day  insulin lispro (HumaLOG) corrective regimen sliding scale  SubCutaneous every 6 hours  ferrous    sulfate 60 mG/mL Liquid 300milliGRAM(s) Enteral Tube daily  pantoprazole   Suspension 40milliGRAM(s) Oral daily  diltiazem    Tablet 90milliGRAM(s) Oral every 6 hours  fludroCORTISONE 0.1milliGRAM(s) Oral daily  levETIRAcetam  Solution 500milliGRAM(s) Oral two times a day    MEDICATIONS  (PRN):  dextrose Gel 1Dose(s) Oral once PRN Blood Glucose LESS THAN 70 milliGRAM(s)/deciliter  glucagon  Injectable 1milliGRAM(s) IntraMuscular once PRN Glucose LESS THAN 70 milligrams/deciliter  ondansetron Injectable 4milliGRAM(s) IV Push every 6 hours PRN Nausea  hydrALAZINE Injectable 10milliGRAM(s) IV Push every 8 hours PRN systolic > 150  artificial  tears Solution 1Drop(s) Both EYES every 2 hours PRN Dry Eyes  LORazepam   Injectable 0.5milliGRAM(s) IntraMuscular daily PRN Agitation

## 2017-01-04 NOTE — PROGRESS NOTE ADULT - PROBLEM SELECTOR PROBLEM 2
Renal failure
CVA (cerebral vascular accident)
Renal failure
Renal failure
Essential hypertension
Renal failure
CVA (cerebral vascular accident)
Renal failure
Type 2 diabetes mellitus with other skin complication
Acute respiratory failure, unspecified whether with hypoxia or hypercapnia
Acute respiratory failure, unspecified whether with hypoxia or hypercapnia
Encephalopathy

## 2017-01-04 NOTE — PROGRESS NOTE ADULT - PROBLEM SELECTOR PLAN 5
As above.
May continue to worsen
no infection per ID - podiatry also following, ck UA for elevated WBC
-due to brain hemorrhage  -bedbound, KPS <10%
-psychosocial support provided to patient's family at bedside. They have a sibling who is in [facility] [place] that they are trying to get him to be able to just at least talk to his mom on the phone to say goodbye. We are having difficulty with this process as the son is in solitary confinement.
-see goals of care note.

## 2017-01-04 NOTE — DISCHARGE NOTE FOR THE EXPIRED PATIENT - HOSPITAL COURSE
· Hospital Course		  Mrs. Cisneros had a hemorrhagic stroke in the right frontal region with extension into the ventricles. She was in the ICU and came to the floor with an NG tube. Unfortunately, she did not demonstrate any neurological improvement, and although she does have some movements, (nonpurposeful, spontaneous), she spiked a fever on 01/02/16 after coughing up some tube feeds. We suspect a large aspiration pneumonia and after speaking with her son (the health care proxy), her daughter and her brothers and sister, she's now a comfort care with admission to hospice.

## 2017-01-04 NOTE — PROGRESS NOTE ADULT - SUBJECTIVE AND OBJECTIVE BOX
Patient seen.    Resting comfortably with facemask in place.    MRA: no vascular malformation    A/P:  Family has no questions at this time  continue supportive care per primary team/palliative

## 2017-01-04 NOTE — PROGRESS NOTE ADULT - ATTENDING COMMENTS
COUNSELING:  Advanced Care Planning Discussion - Time Spent 20 minutes.   More than 50% time spent in counseling and coordinating care. 35 Minutes.     Thank you for the opportunity to assist with the care of this patient.   [facility] [phone].
More than 50% time spent in counseling and coordinating care. 35 Minutes.     Thank you for the opportunity to assist with the care of this patient.   [facility] [phone].
More than 50% time spent in counseling and coordinating care. 35 Minutes.     Thank you for the opportunity to assist with the care of this patient.   [facility] [phone].
Met with family again this AM and they understand prognosis.  Discussed with Dr Nunes who will accept on her service.  Palliative care will continue to follow.  Transfer out of MICU.
Pt has a large ICH with IVH. A repeat ct of the brain and mri of the brain has been ordered. A  CTA of the brain was ordered yet, due to the patients renal insufficiency the CTA is on hold.
I have seen and evaluated the patient agree with above - except on my exam patient opens eyes to voice, slightly more right hand, flaccid left    Lonf family meeting today with palliaitve care also. Patient's family see improvement and would like to continue observation to see if she improves, but if she is to worsen to the point of need a breathin gtube or CPR she would not want to be on machines - DNR/DNI. They are in agreement with full care up until then. They are okay with artifical nutrition. MRI in am  - NG tube TF  - convert Po cardizem  - add norvasc BP goal <150 systolic  - álvarez reinserted for retention

## 2017-01-04 NOTE — PROGRESS NOTE ADULT - PROBLEM SELECTOR PROBLEM 1
Cerebral hemorrhage
Intraparenchymal hemorrhage of brain
Cerebral hemorrhage
Intraparenchymal hemorrhage of brain
Intraparenchymal hemorrhage of brain
Cerebral hemorrhage
Cerebral hemorrhage
Intraparenchymal hemorrhage of brain

## 2017-01-04 NOTE — PROGRESS NOTE ADULT - PROBLEM SELECTOR PROBLEM 3
Ulcer of heel, unspecified laterality, limited to breakdown of skin
Hypertension
Type 2 diabetes mellitus with other skin complication
Ulcer of heel, unspecified laterality, limited to breakdown of skin
Abnormal sputum
Abnormal sputum
Dysphagia, unspecified type

## 2017-01-04 NOTE — PROGRESS NOTE ADULT - PROVIDER SPECIALTY LIST ADULT
Critical Care
Hospitalist
Neurology
Neurosurgery
Palliative Care
Podiatry
Neurosurgery

## 2017-01-04 NOTE — PROGRESS NOTE ADULT - ASSESSMENT
continue supportive care per primary team/palliative
 has:
 has:
71 YOF with ICH/IVH with herniation, AMS/lethargy, foot wound infection.
71 YOF with ICH/IVH with herniation, AMS/lethargy, foot wound infection.
71 yof with multiple medical problems presenting with non-traumatic ICH    Updated family at bedside - poor neuro exam, they wish to wait and see if any improvement, palliative care following
71F IPH with IVH hypertensive urgency. No Neurosx intervention planned.   Now new onset afib with RVR   For now protecting airway.  For now full code.  Will rx for rate control conversion with amio for now.  Obviously no AC.    Palliative for ongoing family discussion.  Case discussed with son at bedside  .
Pt is a 71yf presents with generalize weakness and headache.  Pt has a hx of a fib, PVD, renal insuff, CVA, COPD, CHF, CAD, Left carotid enterectomy
71F s/p catastrophic brain hemorrhage with associated shifting, now with worsening respiratory failure, more periods of apnea, actively dying on comfort care.
71F with catastrophic brain hemorrhage with shifting, with respiratory failure, dysphagia, secretions, with very grave prognosis.
71F with large IPH/IVH with some leftward shifting.
 has:

## 2017-01-04 NOTE — PROGRESS NOTE ADULT - PROBLEM SELECTOR PLAN 1
-BPs controlled  -comfort measures only, comfort care  -tylenol, glycopyrrolate, IV dilaudid as needed  -still unresponsive, and is not clinically progressing  -her entire family understands her guarded prognosis and she's on comfort measures  -NG tube was pulled out as she currently evinces signs of aspiration and respiratory failure
- Neurosugery follow up.  - Noted for DNR/DNI and now family looking for comfort measures.  - Overall with poor prognosis and discussed with family and Dr Nunes.  - Supportive care with comfortable measures.  - DVT prophylaxis.  - Reconsult PRN.
-BPs controlled  -comfort measures only, comfort care  -tylenol, glycopyrrolate, IV dilaudid gtt  -still unresponsive, and is not clinically progressing  -her entire family understands her guarded prognosis and she's on comfort measures  -I spoke with 's PMD,  and explained the reason she's on a comfort based plan, PMD agrees to plan of care and understands the guarded prognosis
-BPs controlled  -comfort measures only, comfort care  -tylenol, glycopyrrolate, IV dilaudid gtt  -still unresponsive, and is not clinically progressing  -her entire family understands her guarded prognosis and she's on comfort measures  -I spoke with 's PMD,  and explained the reason she's on a comfort based plan, PMD agrees to plan of care and understands the guarded prognosis
-BPs controlled  -on 4 BRK, stepdown  -still unresponsive, and is not clinically progressing  -I spoke with her family at length on 12/31, she may be a candidate for hospice.
Continue keppra for prophylaxis.  No neurosurgical intervention.  Discussed with neurosurgical service -- will decrease neuro checks to q4.  Devastating injury; family remains hopeful but understands that decompensation will not result in favorable outcome, so maintains DNR/DNI status.
Supportive care  Keppra for seizure prophylaxix  No neurosurgical intervention.
q 1 hr neuro checks keep BP <150 on cardene drip, NSG no intervention, poor prognosis
-catastrophic. grave prognosis
-very grave prognosis  -not a candidate for any kind of neurosurgical intervention
-very poor overall prognosis  -no neurosurgical intervention.

## 2017-01-04 NOTE — PROGRESS NOTE ADULT - PROBLEM SELECTOR PROBLEM 5
Encephalopathy
CVA (cerebral vascular accident)
Diabetic ulcer of both feet associated with type 2 diabetes mellitus
Encephalopathy
Functional quadriplegia
Palliative care encounter
Palliative care encounter

## 2017-01-04 NOTE — PROGRESS NOTE ADULT - PROBLEM SELECTOR PLAN 2
-creatinine elevated, no nephrotoxic meds  -progressive renal failure
-creatinine elevated, no nephrotoxic meds  -started free water boluses
as above
NPO, SSI
as above
-dilaudid gtt at 1.5mg/ hour now.
-due to IPH  -supportive care  -neuro exams
-order dilaudid ATC and PRN

## 2017-01-04 NOTE — PROGRESS NOTE ADULT - PROBLEM/PLAN-1
DISPLAY PLAN FREE TEXT
